# Patient Record
Sex: FEMALE | Race: BLACK OR AFRICAN AMERICAN | URBAN - METROPOLITAN AREA
[De-identification: names, ages, dates, MRNs, and addresses within clinical notes are randomized per-mention and may not be internally consistent; named-entity substitution may affect disease eponyms.]

---

## 2020-06-15 ENCOUNTER — OFFICE VISIT (OUTPATIENT)
Dept: URBAN - METROPOLITAN AREA SURGERY CENTER 16 | Facility: SURGERY CENTER | Age: 55
End: 2020-06-15

## 2020-06-17 ENCOUNTER — OFFICE VISIT (OUTPATIENT)
Dept: URBAN - METROPOLITAN AREA SURGERY CENTER 16 | Facility: SURGERY CENTER | Age: 55
End: 2020-06-17
Payer: COMMERCIAL

## 2020-06-17 DIAGNOSIS — K44.9 DIAPHRAGMATIC HERNIA: ICD-10-CM

## 2020-06-17 DIAGNOSIS — K31.7 BENIGN GASTRIC POLYP: ICD-10-CM

## 2020-06-17 DIAGNOSIS — K21.0 BILE REFLUX ESOPHAGITIS: ICD-10-CM

## 2020-06-17 PROCEDURE — G8907 PT DOC NO EVENTS ON DISCHARG: HCPCS | Performed by: INTERNAL MEDICINE

## 2020-06-17 PROCEDURE — 43239 EGD BIOPSY SINGLE/MULTIPLE: CPT | Performed by: INTERNAL MEDICINE

## 2020-06-17 RX ORDER — LINACLOTIDE 72 UG/1
CAPSULE, GELATIN COATED ORAL
Qty: 0 | Refills: 0 | Status: ACTIVE | COMMUNITY
Start: 1900-01-01 | End: 1900-01-01

## 2020-06-17 RX ORDER — MUPIROCIN 20 MG/G
OINTMENT TOPICAL
Qty: 0 | Refills: 0 | Status: ACTIVE | COMMUNITY
Start: 1900-01-01 | End: 1900-01-01

## 2020-06-17 RX ORDER — PROMETHAZINE HYDROCHLORIDE 25 MG/1
TABLET ORAL
Qty: 0 | Refills: 0 | Status: ACTIVE | COMMUNITY
Start: 1900-01-01 | End: 1900-01-01

## 2020-06-17 RX ORDER — EPINEPHRINE 0.3 MG/.3ML
INJECT 0.3 MILLILITER (0.3 MG) BY INTRAMUSCULAR ROUTE ONCE AS NEEDED FOR ANAPHYLAXIS INJECTION, SOLUTION INTRAMUSCULAR
Qty: 0 | Refills: 0 | Status: ACTIVE | COMMUNITY
Start: 1900-01-01 | End: 1900-01-01

## 2020-09-08 ENCOUNTER — LAB OUTSIDE AN ENCOUNTER (OUTPATIENT)
Dept: URBAN - METROPOLITAN AREA CLINIC 17 | Facility: CLINIC | Age: 55
End: 2020-09-08

## 2020-09-08 ENCOUNTER — OFFICE VISIT (OUTPATIENT)
Dept: URBAN - METROPOLITAN AREA CLINIC 17 | Facility: CLINIC | Age: 55
End: 2020-09-08
Payer: COMMERCIAL

## 2020-09-08 DIAGNOSIS — K21.0 GASTROESOPHAGEAL REFLUX DISEASE WITH ESOPHAGITIS: ICD-10-CM

## 2020-09-08 DIAGNOSIS — R56.9 SEIZURE: ICD-10-CM

## 2020-09-08 DIAGNOSIS — K58.1 IRRITABLE BOWEL SYNDROME WITH CONSTIPATION: ICD-10-CM

## 2020-09-08 DIAGNOSIS — K76.0 HEPATIC STEATOSIS: ICD-10-CM

## 2020-09-08 DIAGNOSIS — D18.03 HEPATIC HEMANGIOMA: ICD-10-CM

## 2020-09-08 DIAGNOSIS — Z12.11 SCREEN FOR COLON CANCER: ICD-10-CM

## 2020-09-08 DIAGNOSIS — K59.09 CHRONIC CONSTIPATION: ICD-10-CM

## 2020-09-08 DIAGNOSIS — R10.13 DYSPEPSIA: ICD-10-CM

## 2020-09-08 DIAGNOSIS — R10.9 RIGHT SIDED ABDOMINAL PAIN: ICD-10-CM

## 2020-09-08 DIAGNOSIS — E66.9 OBESITY (BMI 30-39.9): ICD-10-CM

## 2020-09-08 DIAGNOSIS — R11.12 PROJECTILE VOMITING WITH NAUSEA: ICD-10-CM

## 2020-09-08 PROCEDURE — 99214 OFFICE O/P EST MOD 30 MIN: CPT | Performed by: INTERNAL MEDICINE

## 2020-09-08 PROCEDURE — G8427 DOCREV CUR MEDS BY ELIG CLIN: HCPCS | Performed by: INTERNAL MEDICINE

## 2020-09-08 PROCEDURE — 3017F COLORECTAL CA SCREEN DOC REV: CPT | Performed by: INTERNAL MEDICINE

## 2020-09-08 PROCEDURE — 1036F TOBACCO NON-USER: CPT | Performed by: INTERNAL MEDICINE

## 2020-09-08 PROCEDURE — G8417 CALC BMI ABV UP PARAM F/U: HCPCS | Performed by: INTERNAL MEDICINE

## 2020-09-08 RX ORDER — EPINEPHRINE 0.3 MG/.3ML
INJECT 0.3 MILLILITER (0.3 MG) BY INTRAMUSCULAR ROUTE ONCE AS NEEDED FOR ANAPHYLAXIS INJECTION, SOLUTION INTRAMUSCULAR
Qty: 0 | Refills: 0 | Status: ACTIVE | COMMUNITY
Start: 1900-01-01

## 2020-09-08 RX ORDER — PLECANATIDE 3 MG/1
1 TABLET TABLET ORAL ONCE A DAY
Status: ACTIVE | COMMUNITY

## 2020-09-08 RX ORDER — PROMETHAZINE HYDROCHLORIDE 25 MG/1
TABLET ORAL
Qty: 0 | Refills: 0 | Status: ACTIVE | COMMUNITY
Start: 1900-01-01

## 2020-09-08 RX ORDER — LINACLOTIDE 72 UG/1
CAPSULE, GELATIN COATED ORAL
Qty: 0 | Refills: 0 | Status: DISCONTINUED | COMMUNITY
Start: 1900-01-01

## 2020-09-08 RX ORDER — MUPIROCIN 20 MG/G
OINTMENT TOPICAL
Qty: 0 | Refills: 0 | Status: ACTIVE | COMMUNITY
Start: 1900-01-01

## 2020-09-08 RX ORDER — OXCARBAZEPINE 150 MG/1
2 TABLETS TABLET, FILM COATED ORAL TWICE A DAY
Status: ACTIVE | COMMUNITY

## 2020-09-08 NOTE — HPI-OTHER HISTORIES
55 yo lady pt of dR Hayde Marinelli. She is a pt of DR Morales and wants a second opinion for IBS. She was diagnosed IBS M she says 15 yrs ago. Says in the last year has been mostly constipation. She has about 2 BMs a week spontaneously, hard stools with straining with incomplete evacuation. Per Dr Morales notes, she took 290 mcg 3 times a week due to diarrhea. 145 mcg took 3-4 days to work. She had a normal colonosocpy per his notes in 2015 and was told to repeat in 10 yrs. "I had some polyps a long long long time ago". SHe had R ,mid abdominal pain. Pain is present when she has to have a BM "I am bent over" and then it lets up a bit she says. Per Dr Morales note, a CT abd pelvis in 7/2018 showing stool in RUQ. SHe sometimes wakes up in the am with nausea and vomiting . Mostly it is retching as she has an empty stomach. She says throwing up "is better than what it was, its just the pain". She takes pantoprazole for acid reflux which is controlled. She takes dicyclomine and carafate and peptobismol as needed. She has not needed carafate in 2 weeks. She takes PPI daily and dicyclomine "all the time". Results of CBC and CMP from 7/2019 were unremarkable.  1/14/20 discussed CT from 7/2019 and US from 12/2019. discussed results in detail. I do not have EGD and colon reports from DR Morales yet. On trulance she did not have any abdominal pain. Likely 2/2 IBS C She ran out of samples and has not been able to afford it at this time.  3/12/20 Here for f/u visit. She had a viral infection recently with vomiting. Went to ER - given Zofran, toradol, morphine. Abdominal pain was so severe in RLQ. She had a CT - unremarkable. Pain is still present "but it dont hurt that bad". C/o  burning in her throat "all the time after the episode". No dysphagia. 3/5/20 CT mild thickening of small bowel loops diffusely with few air fluid levels ? mild enteritis. CBC and CMP unremarkable.  5/21/20 doing "a little better". She has been taking linzess 145 "it had stopped working and then I had to take dulcolax for it to work". My notes state that she had been controlled on Trulance. No notes to indicate that she was switched to Linzess. She will go home, check her meds and let us know what she is on. Dicyclomine helps her pain - but makes her go to sleep so she only takes it at night. She has never taken Amitiza.  9/8/20 Says she has brain cavernous malformations and "they may need to do surgery". Says she takes Trulance 3 mg 2-3 times a week. "it cleans out good". She is here with her Aunt "the brain thing makes stutter sometimes". Pantorpazole controls her reflux

## 2020-09-24 ENCOUNTER — OFFICE VISIT (OUTPATIENT)
Dept: URBAN - METROPOLITAN AREA CLINIC 16 | Facility: CLINIC | Age: 55
End: 2020-09-24
Payer: COMMERCIAL

## 2020-09-24 DIAGNOSIS — K76.9 LIVER LESION, RIGHT LOBE: ICD-10-CM

## 2020-09-24 DIAGNOSIS — K76.0 FATTY INFILTRATION OF LIVER: ICD-10-CM

## 2020-09-24 PROCEDURE — 76705 ECHO EXAM OF ABDOMEN: CPT | Performed by: INTERNAL MEDICINE

## 2020-09-24 RX ORDER — PLECANATIDE 3 MG/1
1 TABLET TABLET ORAL ONCE A DAY
Status: ACTIVE | COMMUNITY

## 2020-09-24 RX ORDER — PROMETHAZINE HYDROCHLORIDE 25 MG/1
TABLET ORAL
Qty: 0 | Refills: 0 | Status: ACTIVE | COMMUNITY
Start: 1900-01-01

## 2020-09-24 RX ORDER — OXCARBAZEPINE 150 MG/1
2 TABLETS TABLET, FILM COATED ORAL TWICE A DAY
Status: ACTIVE | COMMUNITY

## 2020-09-24 RX ORDER — MUPIROCIN 20 MG/G
OINTMENT TOPICAL
Qty: 0 | Refills: 0 | Status: ACTIVE | COMMUNITY
Start: 1900-01-01

## 2020-09-24 RX ORDER — EPINEPHRINE 0.3 MG/.3ML
INJECT 0.3 MILLILITER (0.3 MG) BY INTRAMUSCULAR ROUTE ONCE AS NEEDED FOR ANAPHYLAXIS INJECTION, SOLUTION INTRAMUSCULAR
Qty: 0 | Refills: 0 | Status: ACTIVE | COMMUNITY
Start: 1900-01-01

## 2020-12-10 ENCOUNTER — OFFICE VISIT (OUTPATIENT)
Dept: URBAN - METROPOLITAN AREA CLINIC 17 | Facility: CLINIC | Age: 55
End: 2020-12-10
Payer: COMMERCIAL

## 2020-12-10 DIAGNOSIS — K58.1 IRRITABLE BOWEL SYNDROME WITH CONSTIPATION: ICD-10-CM

## 2020-12-10 DIAGNOSIS — R11.12 PROJECTILE VOMITING WITH NAUSEA: ICD-10-CM

## 2020-12-10 DIAGNOSIS — R56.9 SEIZURE: ICD-10-CM

## 2020-12-10 DIAGNOSIS — R10.13 DYSPEPSIA: ICD-10-CM

## 2020-12-10 DIAGNOSIS — K59.09 CHRONIC CONSTIPATION: ICD-10-CM

## 2020-12-10 DIAGNOSIS — Z12.11 SCREEN FOR COLON CANCER: ICD-10-CM

## 2020-12-10 DIAGNOSIS — R10.84 ABDOMINAL CRAMPING, GENERALIZED: ICD-10-CM

## 2020-12-10 DIAGNOSIS — R10.9 RIGHT SIDED ABDOMINAL PAIN: ICD-10-CM

## 2020-12-10 DIAGNOSIS — E66.9 OBESITY (BMI 30-39.9): ICD-10-CM

## 2020-12-10 PROCEDURE — G8417 CALC BMI ABV UP PARAM F/U: HCPCS | Performed by: INTERNAL MEDICINE

## 2020-12-10 PROCEDURE — G8427 DOCREV CUR MEDS BY ELIG CLIN: HCPCS | Performed by: INTERNAL MEDICINE

## 2020-12-10 PROCEDURE — G9903 PT SCRN TBCO ID AS NON USER: HCPCS | Performed by: INTERNAL MEDICINE

## 2020-12-10 PROCEDURE — G8482 FLU IMMUNIZE ORDER/ADMIN: HCPCS | Performed by: INTERNAL MEDICINE

## 2020-12-10 PROCEDURE — 99214 OFFICE O/P EST MOD 30 MIN: CPT | Performed by: INTERNAL MEDICINE

## 2020-12-10 PROCEDURE — 1036F TOBACCO NON-USER: CPT | Performed by: INTERNAL MEDICINE

## 2020-12-10 PROCEDURE — 3017F COLORECTAL CA SCREEN DOC REV: CPT | Performed by: INTERNAL MEDICINE

## 2020-12-10 RX ORDER — EPINEPHRINE 0.3 MG/.3ML
INJECT 0.3 MILLILITER (0.3 MG) BY INTRAMUSCULAR ROUTE ONCE AS NEEDED FOR ANAPHYLAXIS INJECTION, SOLUTION INTRAMUSCULAR
Qty: 0 | Refills: 0 | Status: ACTIVE | COMMUNITY
Start: 1900-01-01

## 2020-12-10 RX ORDER — TOPIRAMATE 25 MG/1
1 TABLET TABLET, FILM COATED ORAL ONCE A DAY
Status: ACTIVE | COMMUNITY

## 2020-12-10 RX ORDER — PREDNISONE 20 MG/1
1 TABLET TABLET ORAL ONCE A DAY
Status: ACTIVE | COMMUNITY

## 2020-12-10 RX ORDER — OXCARBAZEPINE 150 MG/1
2 TABLETS TABLET, FILM COATED ORAL TWICE A DAY
Status: ACTIVE | COMMUNITY

## 2020-12-10 RX ORDER — MUPIROCIN 20 MG/G
OINTMENT TOPICAL
Qty: 0 | Refills: 0 | Status: ACTIVE | COMMUNITY
Start: 1900-01-01

## 2020-12-10 RX ORDER — PLECANATIDE 3 MG/1
1 TABLET TABLET ORAL ONCE A DAY
Status: ACTIVE | COMMUNITY

## 2020-12-10 RX ORDER — PROMETHAZINE HYDROCHLORIDE 25 MG/1
TABLET ORAL
Qty: 0 | Refills: 0 | Status: ACTIVE | COMMUNITY
Start: 1900-01-01

## 2020-12-10 NOTE — HPI-OTHER HISTORIES
55 yo lady pt of dR Hayde Marinelli. She is a pt of DR Morales and wants a second opinion for IBS. She was diagnosed IBS M she says 15 yrs ago. Says in the last year has been mostly constipation. She has about 2 BMs a week spontaneously, hard stools with straining with incomplete evacuation. Per Dr Morales notes, she took 290 mcg 3 times a week due to diarrhea. 145 mcg took 3-4 days to work. She had a normal colonosocpy per his notes in 2015 and was told to repeat in 10 yrs. "I had some polyps a long long long time ago". SHe had R ,mid abdominal pain. Pain is present when she has to have a BM "I am bent over" and then it lets up a bit she says. Per Dr Morales note, a CT abd pelvis in 7/2018 showing stool in RUQ. SHe sometimes wakes up in the am with nausea and vomiting . Mostly it is retching as she has an empty stomach. She says throwing up "is better than what it was, its just the pain". She takes pantoprazole for acid reflux which is controlled. She takes dicyclomine and carafate and peptobismol as needed. She has not needed carafate in 2 weeks. She takes PPI daily and dicyclomine "all the time". Results of CBC and CMP from 7/2019 were unremarkable.  1/14/20 discussed CT from 7/2019 and US from 12/2019. discussed results in detail. I do not have EGD and colon reports from DR Morales yet. On trulance she did not have any abdominal pain. Likely 2/2 IBS C She ran out of samples and has not been able to afford it at this time.  3/12/20 Here for f/u visit. She had a viral infection recently with vomiting. Went to ER - given Zofran, toradol, morphine. Abdominal pain was so severe in RLQ. She had a CT - unremarkable. Pain is still present "but it dont hurt that bad". C/o  burning in her throat "all the time after the episode". No dysphagia. 3/5/20 CT mild thickening of small bowel loops diffusely with few air fluid levels ? mild enteritis. CBC and CMP unremarkable.  5/21/20 doing "a little better". She has been taking linzess 145 "it had stopped working and then I had to take dulcolax for it to work". My notes state that she had been controlled on Trulance. No notes to indicate that she was switched to Linzess. She will go home, check her meds and let us know what she is on. Dicyclomine helps her pain - but makes her go to sleep so she only takes it at night. She has never taken Amitiza.  9/8/20 Says she has brain cavernous malformations and "they may need to do surgery". Says she takes Trulance 3 mg 2-3 times a week. "it cleans out good". She is here with her Aunt "the brain thing makes stutter sometimes". Pantorpazole controls her reflux  12/10/20 Was recently diagnosed with epilepsy. She cant drive for 6 months. Her  has been very supportive.  Is now  on oxacarbazepine, topiramate, and prednisone. She takes Trulance 3 mg 2-3 times a week, she has a BM qod.  She c/o R flank pain "sometimes I cant walk good, it is so bad it makes me cry".  She was in the ED 12/7 for same.  CT stone protocol stable 2.8 cm liver lesion. ; stable L1 hemangioma in vertebral body.  It was thought msk and she was given a lidoderm patch with improvement.

## 2020-12-10 NOTE — EXAM-PHYSICAL EXAM
Gen: Alert, oriented, in no distress Heent: no icterus, lips wnl Lungs: bilateral breath sounds CVS: first and second heart sounds Abdomen: full, soft, non tender. tenderness is over lower R flank rib reproducible Ext: no edema Joints: normal joints and symmetry Spine: consistent with age Skin: no rash on exposed areas Neuro: no focal localizing signs

## 2020-12-23 ENCOUNTER — TELEPHONE ENCOUNTER (OUTPATIENT)
Dept: URBAN - METROPOLITAN AREA CLINIC 105 | Facility: CLINIC | Age: 55
End: 2020-12-23

## 2020-12-23 RX ORDER — PANTOPRAZOLE SODIUM 40 MG/1
1 TABLET TABLET, DELAYED RELEASE ORAL ONCE A DAY
Qty: 90 | Refills: 0 | OUTPATIENT
Start: 2020-12-24

## 2020-12-24 ENCOUNTER — TELEPHONE ENCOUNTER (OUTPATIENT)
Dept: URBAN - METROPOLITAN AREA CLINIC 92 | Facility: CLINIC | Age: 55
End: 2020-12-24

## 2021-11-23 ENCOUNTER — OFFICE VISIT (OUTPATIENT)
Dept: URBAN - METROPOLITAN AREA CLINIC 17 | Facility: CLINIC | Age: 56
End: 2021-11-23
Payer: COMMERCIAL

## 2021-11-23 ENCOUNTER — WEB ENCOUNTER (OUTPATIENT)
Dept: URBAN - METROPOLITAN AREA CLINIC 17 | Facility: CLINIC | Age: 56
End: 2021-11-23

## 2021-11-23 VITALS
HEIGHT: 64 IN | TEMPERATURE: 97.5 F | BODY MASS INDEX: 37.56 KG/M2 | WEIGHT: 220 LBS | DIASTOLIC BLOOD PRESSURE: 70 MMHG | SYSTOLIC BLOOD PRESSURE: 110 MMHG | HEART RATE: 80 BPM

## 2021-11-23 DIAGNOSIS — R10.13 DYSPEPSIA: ICD-10-CM

## 2021-11-23 DIAGNOSIS — E66.9 OBESITY (BMI 30-39.9): ICD-10-CM

## 2021-11-23 DIAGNOSIS — K58.1 IRRITABLE BOWEL SYNDROME WITH CONSTIPATION: ICD-10-CM

## 2021-11-23 DIAGNOSIS — Z12.11 SCREEN FOR COLON CANCER: ICD-10-CM

## 2021-11-23 DIAGNOSIS — G40.919 INTRACTABLE EPILEPSY WITHOUT STATUS EPILEPTICUS, UNSPECIFIED EPILEPSY TYPE: ICD-10-CM

## 2021-11-23 DIAGNOSIS — R56.9 SEIZURE: ICD-10-CM

## 2021-11-23 DIAGNOSIS — K76.0 HEPATIC STEATOSIS: ICD-10-CM

## 2021-11-23 DIAGNOSIS — D18.03 HEPATIC HEMANGIOMA: ICD-10-CM

## 2021-11-23 DIAGNOSIS — R11.12 PROJECTILE VOMITING WITH NAUSEA: ICD-10-CM

## 2021-11-23 DIAGNOSIS — K21.00 CHRONIC REFLUX ESOPHAGITIS: ICD-10-CM

## 2021-11-23 DIAGNOSIS — R10.84 ABDOMINAL PAIN, GENERALIZED: ICD-10-CM

## 2021-11-23 PROCEDURE — 99213 OFFICE O/P EST LOW 20 MIN: CPT | Performed by: INTERNAL MEDICINE

## 2021-11-23 RX ORDER — PANTOPRAZOLE SODIUM 40 MG/1
1 TABLET TABLET, DELAYED RELEASE ORAL ONCE A DAY
Qty: 90 | Refills: 3
Start: 2020-12-24

## 2021-11-23 RX ORDER — OXCARBAZEPINE 150 MG/1
2 TABLETS TABLET, FILM COATED ORAL TWICE A DAY
Status: ACTIVE | COMMUNITY

## 2021-11-23 RX ORDER — PLECANATIDE 3 MG/1
1 TABLET TABLET ORAL ONCE A DAY
Qty: 90 | Refills: 3

## 2021-11-23 RX ORDER — MUPIROCIN 20 MG/G
OINTMENT TOPICAL
Qty: 0 | Refills: 0 | Status: ACTIVE | COMMUNITY
Start: 1900-01-01

## 2021-11-23 RX ORDER — PROMETHAZINE HYDROCHLORIDE 25 MG/1
TABLET ORAL
Qty: 0 | Refills: 0 | Status: ACTIVE | COMMUNITY
Start: 1900-01-01

## 2021-11-23 RX ORDER — PLECANATIDE 3 MG/1
1 TABLET TABLET ORAL ONCE A DAY
Status: ACTIVE | COMMUNITY

## 2021-11-23 RX ORDER — PREDNISONE 20 MG/1
1 TABLET TABLET ORAL ONCE A DAY
Status: ACTIVE | COMMUNITY

## 2021-11-23 RX ORDER — PANTOPRAZOLE SODIUM 40 MG/1
1 TABLET TABLET, DELAYED RELEASE ORAL ONCE A DAY
Qty: 90 | Refills: 0 | Status: ACTIVE | COMMUNITY
Start: 2020-12-24

## 2021-11-23 RX ORDER — TOPIRAMATE 25 MG/1
1 TABLET TABLET, FILM COATED ORAL ONCE A DAY
Status: ACTIVE | COMMUNITY

## 2021-11-23 RX ORDER — EPINEPHRINE 0.3 MG/.3ML
INJECT 0.3 MILLILITER (0.3 MG) BY INTRAMUSCULAR ROUTE ONCE AS NEEDED FOR ANAPHYLAXIS INJECTION, SOLUTION INTRAMUSCULAR
Qty: 0 | Refills: 0 | Status: ACTIVE | COMMUNITY
Start: 1900-01-01

## 2021-11-23 NOTE — HPI-OTHER HISTORIES
53 yo lady pt of dR Hayde Marinelli. She is a pt of DR Morales and wants a second opinion for IBS. She was diagnosed IBS M she says 15 yrs ago. Says in the last year has been mostly constipation. She has about 2 BMs a week spontaneously, hard stools with straining with incomplete evacuation. Per Dr Morales notes, she took 290 mcg 3 times a week due to diarrhea. 145 mcg took 3-4 days to work. She had a normal colonosocpy per his notes in 2015 and was told to repeat in 10 yrs. "I had some polyps a long long long time ago". SHe had R ,mid abdominal pain. Pain is present when she has to have a BM "I am bent over" and then it lets up a bit she says. Per Dr Morales note, a CT abd pelvis in 7/2018 showing stool in RUQ. SHe sometimes wakes up in the am with nausea and vomiting . Mostly it is retching as she has an empty stomach. She says throwing up "is better than what it was, its just the pain". She takes pantoprazole for acid reflux which is controlled. She takes dicyclomine and carafate and peptobismol as needed. She has not needed carafate in 2 weeks. She takes PPI daily and dicyclomine "all the time". Results of CBC and CMP from 7/2019 were unremarkable.  1/14/20 discussed CT from 7/2019 and US from 12/2019. discussed results in detail. I do not have EGD and colon reports from DR Morales yet. On trulance she did not have any abdominal pain. Likely 2/2 IBS C She ran out of samples and has not been able to afford it at this time.  3/12/20 Here for f/u visit. She had a viral infection recently with vomiting. Went to ER - given Zofran, toradol, morphine. Abdominal pain was so severe in RLQ. She had a CT - unremarkable. Pain is still present "but it dont hurt that bad". C/o  burning in her throat "all the time after the episode". No dysphagia. 3/5/20 CT mild thickening of small bowel loops diffusely with few air fluid levels ? mild enteritis. CBC and CMP unremarkable.  5/21/20 doing "a little better". She has been taking linzess 145 "it had stopped working and then I had to take dulcolax for it to work". My notes state that she had been controlled on Trulance. No notes to indicate that she was switched to Linzess. She will go home, check her meds and let us know what she is on. Dicyclomine helps her pain - but makes her go to sleep so she only takes it at night. She has never taken Amitiza.  9/8/20 Says she has brain cavernous malformations and "they may need to do surgery". Says she takes Trulance 3 mg 2-3 times a week. "it cleans out good". She is here with her Aunt "the brain thing makes stutter sometimes". Pantorpazole controls her reflux  12/10/20 Was recently diagnosed with epilepsy. She cant drive for 6 months. Her  has been very supportive.  Is now  on oxacarbazepine, topiramate, and prednisone. She takes Trulance 3 mg 2-3 times a week, she has a BM qod.  She c/o R flank pain "sometimes I cant walk good, it is so bad it makes me cry".  She was in the ED 12/7 for same.  CT stone protocol stable 2.8 cm liver lesion. ; stable L1 hemangioma in vertebral body.  It was thought msk and she was given a lidoderm patch with improvement.   11/23/21 Here for f.u visit Ran out of Pantoprazole 40 mg which usually controls her reflux Need Trulance also and needs samples/script. Says she wanted also to update me on her health. Still not driving as she kept having seizures. She says brain surgery is being discussed.

## 2021-11-24 LAB
ALBUMIN: 4.5
ALKALINE PHOSPHATASE: 114
ALT (SGPT): 25
AST (SGOT): 20
BILIRUBIN, DIRECT: <0.1
BILIRUBIN, TOTAL: <0.2
PROTEIN, TOTAL: 6.9

## 2021-11-30 ENCOUNTER — OFFICE VISIT (OUTPATIENT)
Dept: URBAN - METROPOLITAN AREA CLINIC 17 | Facility: CLINIC | Age: 56
End: 2021-11-30

## 2022-05-19 ENCOUNTER — CLAIMS CREATED FROM THE CLAIM WINDOW (OUTPATIENT)
Dept: URBAN - METROPOLITAN AREA CLINIC 17 | Facility: CLINIC | Age: 57
End: 2022-05-19
Payer: COMMERCIAL

## 2022-05-19 DIAGNOSIS — D18.03 HEPATIC HEMANGIOMA: ICD-10-CM

## 2022-05-19 DIAGNOSIS — R56.9 SEIZURE: ICD-10-CM

## 2022-05-19 DIAGNOSIS — R10.13 DYSPEPSIA: ICD-10-CM

## 2022-05-19 DIAGNOSIS — K21.0 GASTROESOPHAGEAL REFLUX DISEASE WITH ESOPHAGITIS: ICD-10-CM

## 2022-05-19 DIAGNOSIS — K21.00 ALKALINE REFLUX ESOPHAGITIS: ICD-10-CM

## 2022-05-19 DIAGNOSIS — Z12.11 SCREEN FOR COLON CANCER: ICD-10-CM

## 2022-05-19 DIAGNOSIS — K59.09 CHRONIC CONSTIPATION: ICD-10-CM

## 2022-05-19 DIAGNOSIS — G40.919 INTRACTABLE EPILEPSY WITHOUT STATUS EPILEPTICUS, UNSPECIFIED EPILEPSY TYPE: ICD-10-CM

## 2022-05-19 DIAGNOSIS — R10.9 FLANK PAIN: ICD-10-CM

## 2022-05-19 DIAGNOSIS — K58.1 IRRITABLE BOWEL SYNDROME WITH CONSTIPATION: ICD-10-CM

## 2022-05-19 DIAGNOSIS — R10.9 RIGHT SIDED ABDOMINAL PAIN: ICD-10-CM

## 2022-05-19 DIAGNOSIS — K76.0 HEPATIC STEATOSIS: ICD-10-CM

## 2022-05-19 DIAGNOSIS — R10.84 ABDOMINAL PAIN, GENERALIZED: ICD-10-CM

## 2022-05-19 DIAGNOSIS — E66.9 OBESITY (BMI 30-39.9): ICD-10-CM

## 2022-05-19 DIAGNOSIS — R11.12 PROJECTILE VOMITING WITH NAUSEA: ICD-10-CM

## 2022-05-19 PROCEDURE — 99213 OFFICE O/P EST LOW 20 MIN: CPT | Performed by: INTERNAL MEDICINE

## 2022-05-19 RX ORDER — OXCARBAZEPINE 150 MG/1
2 TABLETS TABLET, FILM COATED ORAL TWICE A DAY
Status: ACTIVE | COMMUNITY

## 2022-05-19 RX ORDER — EPINEPHRINE 0.3 MG/.3ML
INJECT 0.3 MILLILITER (0.3 MG) BY INTRAMUSCULAR ROUTE ONCE AS NEEDED FOR ANAPHYLAXIS INJECTION, SOLUTION INTRAMUSCULAR
Qty: 0 | Refills: 0 | Status: ON HOLD | COMMUNITY
Start: 1900-01-01

## 2022-05-19 RX ORDER — PROMETHAZINE HYDROCHLORIDE 25 MG/1
TABLET ORAL
Qty: 0 | Refills: 0 | Status: ON HOLD | COMMUNITY
Start: 1900-01-01

## 2022-05-19 RX ORDER — MUPIROCIN 20 MG/G
OINTMENT TOPICAL
Qty: 0 | Refills: 0 | Status: ON HOLD | COMMUNITY
Start: 1900-01-01

## 2022-05-19 RX ORDER — PANTOPRAZOLE SODIUM 40 MG/1
1 TABLET TABLET, DELAYED RELEASE ORAL ONCE A DAY
Qty: 90 | Refills: 3 | Status: ACTIVE | COMMUNITY
Start: 2020-12-24

## 2022-05-19 RX ORDER — TOPIRAMATE 25 MG/1
1 TABLET TABLET, FILM COATED ORAL ONCE A DAY
Status: ACTIVE | COMMUNITY

## 2022-05-19 RX ORDER — PANTOPRAZOLE SODIUM 40 MG/1
1 TABLET TABLET, DELAYED RELEASE ORAL ONCE A DAY
Qty: 90 | Refills: 3

## 2022-05-19 RX ORDER — PLECANATIDE 3 MG/1
1 TABLET TABLET ORAL ONCE A DAY
Qty: 90 | Refills: 3

## 2022-05-19 RX ORDER — PLECANATIDE 3 MG/1
1 TABLET TABLET ORAL ONCE A DAY
Qty: 90 | Refills: 3 | Status: ACTIVE | COMMUNITY

## 2022-05-19 RX ORDER — PREDNISONE 20 MG/1
1 TABLET TABLET ORAL ONCE A DAY
Status: ON HOLD | COMMUNITY

## 2022-05-19 NOTE — HPI-OTHER HISTORIES
55 yo lady pt of dR Hayde Marinelli. She is a pt of DR Morales and wants a second opinion for IBS. She was diagnosed IBS M she says 15 yrs ago. Says in the last year has been mostly constipation. She has about 2 BMs a week spontaneously, hard stools with straining with incomplete evacuation. Per Dr Morales notes, she took 290 mcg 3 times a week due to diarrhea. 145 mcg took 3-4 days to work. She had a normal colonosocpy per his notes in 2015 and was told to repeat in 10 yrs. "I had some polyps a long long long time ago". SHe had R ,mid abdominal pain. Pain is present when she has to have a BM "I am bent over" and then it lets up a bit she says. Per Dr Morales note, a CT abd pelvis in 7/2018 showing stool in RUQ. SHe sometimes wakes up in the am with nausea and vomiting . Mostly it is retching as she has an empty stomach. She says throwing up "is better than what it was, its just the pain". She takes pantoprazole for acid reflux which is controlled. She takes dicyclomine and carafate and peptobismol as needed. She has not needed carafate in 2 weeks. She takes PPI daily and dicyclomine "all the time". Results of CBC and CMP from 7/2019 were unremarkable.  1/14/20 discussed CT from 7/2019 and US from 12/2019. discussed results in detail. I do not have EGD and colon reports from DR Morales yet. On trulance she did not have any abdominal pain. Likely 2/2 IBS C She ran out of samples and has not been able to afford it at this time.  3/12/20 Here for f/u visit. She had a viral infection recently with vomiting. Went to ER - given Zofran, toradol, morphine. Abdominal pain was so severe in RLQ. She had a CT - unremarkable. Pain is still present "but it dont hurt that bad". C/o  burning in her throat "all the time after the episode". No dysphagia. 3/5/20 CT mild thickening of small bowel loops diffusely with few air fluid levels ? mild enteritis. CBC and CMP unremarkable.  5/21/20 doing "a little better". She has been taking linzess 145 "it had stopped working and then I had to take dulcolax for it to work". My notes state that she had been controlled on Trulance. No notes to indicate that she was switched to Linzess. She will go home, check her meds and let us know what she is on. Dicyclomine helps her pain - but makes her go to sleep so she only takes it at night. She has never taken Amitiza.  9/8/20 Says she has brain cavernous malformations and "they may need to do surgery". Says she takes Trulance 3 mg 2-3 times a week. "it cleans out good". She is here with her Aunt "the brain thing makes stutter sometimes". Pantorpazole controls her reflux  12/10/20 Was recently diagnosed with epilepsy. She cant drive for 6 months. Her  has been very supportive.  Is now  on oxacarbazepine, topiramate, and prednisone. She takes Trulance 3 mg 2-3 times a week, she has a BM qod.  She c/o R flank pain "sometimes I cant walk good, it is so bad it makes me cry".  She was in the ED 12/7 for same.  CT stone protocol stable 2.8 cm liver lesion. ; stable L1 hemangioma in vertebral body.  It was thought msk and she was given a lidoderm patch with improvement.   11/23/21 Here for f.u visit Ran out of Pantoprazole 40 mg which usually controls her reflux Need Trulance also and needs samples/script. Says she wanted also to update me on her health. Still not driving as she kept having seizures. She says brain surgery is being discussed.   5/19/22 Here for f.u visit SAys had brain surgery 1/2022 and has had seizures afterwards. Will  need more surgery WAs in ER about 3 months ago for R sided abd pain. now resolved. [facility] to [facility]. Says CT was negative.  Ran out of Trulance - needs a script.  Takes Pantoprazole daily which controls her reflux/ Says her recent weight gain makes it imperative that she takes her PPI daily.  Rarely needs to take dicyclomine now

## 2022-05-24 ENCOUNTER — OFFICE VISIT (OUTPATIENT)
Dept: URBAN - METROPOLITAN AREA CLINIC 17 | Facility: CLINIC | Age: 57
End: 2022-05-24

## 2022-08-08 ENCOUNTER — OFFICE VISIT (OUTPATIENT)
Dept: URBAN - METROPOLITAN AREA CLINIC 17 | Facility: CLINIC | Age: 57
End: 2022-08-08
Payer: COMMERCIAL

## 2022-08-08 VITALS
HEIGHT: 64 IN | DIASTOLIC BLOOD PRESSURE: 76 MMHG | SYSTOLIC BLOOD PRESSURE: 123 MMHG | TEMPERATURE: 97.9 F | HEART RATE: 68 BPM | WEIGHT: 222 LBS | BODY MASS INDEX: 37.9 KG/M2

## 2022-08-08 DIAGNOSIS — K76.0 HEPATIC STEATOSIS: ICD-10-CM

## 2022-08-08 DIAGNOSIS — E66.9 OBESITY (BMI 30-39.9): ICD-10-CM

## 2022-08-08 DIAGNOSIS — R11.12 PROJECTILE VOMITING WITH NAUSEA: ICD-10-CM

## 2022-08-08 DIAGNOSIS — R10.84 ABDOMINAL CRAMPING, GENERALIZED: ICD-10-CM

## 2022-08-08 DIAGNOSIS — D18.03 HEPATIC HEMANGIOMA: ICD-10-CM

## 2022-08-08 DIAGNOSIS — Z12.11 SCREEN FOR COLON CANCER: ICD-10-CM

## 2022-08-08 DIAGNOSIS — G40.919 INTRACTABLE EPILEPSY WITHOUT STATUS EPILEPTICUS, UNSPECIFIED EPILEPSY TYPE: ICD-10-CM

## 2022-08-08 DIAGNOSIS — K58.1 IRRITABLE BOWEL SYNDROME WITH CONSTIPATION: ICD-10-CM

## 2022-08-08 DIAGNOSIS — R10.13 DYSPEPSIA: ICD-10-CM

## 2022-08-08 PROCEDURE — 99214 OFFICE O/P EST MOD 30 MIN: CPT | Performed by: INTERNAL MEDICINE

## 2022-08-08 RX ORDER — DICYCLOMINE HYDROCHLORIDE 10 MG/1
1 TABLET CAPSULE ORAL
Qty: 270 | Refills: 0 | OUTPATIENT
Start: 2022-08-08 | End: 2022-11-06

## 2022-08-08 RX ORDER — PROMETHAZINE HYDROCHLORIDE 25 MG/1
TABLET ORAL
Qty: 0 | Refills: 0 | Status: ACTIVE | COMMUNITY
Start: 1900-01-01

## 2022-08-08 RX ORDER — PANTOPRAZOLE SODIUM 40 MG/1
1 TABLET TABLET, DELAYED RELEASE ORAL ONCE A DAY
Qty: 180 TABLET | Refills: 3

## 2022-08-08 RX ORDER — EPINEPHRINE 0.3 MG/.3ML
INJECT 0.3 MILLILITER (0.3 MG) BY INTRAMUSCULAR ROUTE ONCE AS NEEDED FOR ANAPHYLAXIS INJECTION, SOLUTION INTRAMUSCULAR
Qty: 0 | Refills: 0 | Status: ACTIVE | COMMUNITY
Start: 1900-01-01

## 2022-08-08 RX ORDER — ATORVASTATIN CALCIUM 10 MG/1
1 TABLET TABLET, FILM COATED ORAL ONCE A DAY
Status: ACTIVE | COMMUNITY

## 2022-08-08 RX ORDER — PLECANATIDE 3 MG/1
1 TABLET TABLET ORAL ONCE A DAY
Qty: 90 | Refills: 3 | Status: ACTIVE | COMMUNITY

## 2022-08-08 RX ORDER — PANTOPRAZOLE SODIUM 40 MG/1
1 TABLET TABLET, DELAYED RELEASE ORAL ONCE A DAY
Qty: 90 | Refills: 3 | Status: ACTIVE | COMMUNITY

## 2022-08-08 RX ORDER — TOPIRAMATE 25 MG/1
1 TABLET TABLET, FILM COATED ORAL ONCE A DAY
Status: ACTIVE | COMMUNITY

## 2022-08-08 RX ORDER — OXCARBAZEPINE 150 MG/1
2 TABLETS TABLET, FILM COATED ORAL TWICE A DAY
Status: DISCONTINUED | COMMUNITY

## 2022-08-08 RX ORDER — PREDNISONE 20 MG/1
1 TABLET TABLET ORAL ONCE A DAY
Status: DISCONTINUED | COMMUNITY

## 2022-08-08 RX ORDER — METHOTREXATE 2.5 MG/1
AS DIRECTED TABLET ORAL
Status: ACTIVE | COMMUNITY

## 2022-08-08 RX ORDER — MUPIROCIN 20 MG/G
OINTMENT TOPICAL
Qty: 0 | Refills: 0 | Status: ACTIVE | COMMUNITY
Start: 1900-01-01

## 2022-08-08 RX ORDER — ONDANSETRON HYDROCHLORIDE 8 MG/1
1 CAPSULE TABLET, FILM COATED ORAL
Qty: 90 | Refills: 0 | OUTPATIENT
Start: 2022-08-08 | End: 2022-09-07

## 2022-08-08 NOTE — HPI-OTHER HISTORIES
55 yo lady pt of dR Hayde Marinelli. She is a pt of DR Morales and wants a second opinion for IBS. She was diagnosed IBS M she says 15 yrs ago. Says in the last year has been mostly constipation. She has about 2 BMs a week spontaneously, hard stools with straining with incomplete evacuation. Per Dr Morales notes, she took 290 mcg 3 times a week due to diarrhea. 145 mcg took 3-4 days to work. She had a normal colonosocpy per his notes in 2015 and was told to repeat in 10 yrs. "I had some polyps a long long long time ago". SHe had R ,mid abdominal pain. Pain is present when she has to have a BM "I am bent over" and then it lets up a bit she says. Per Dr Morales note, a CT abd pelvis in 7/2018 showing stool in RUQ. SHe sometimes wakes up in the am with nausea and vomiting . Mostly it is retching as she has an empty stomach. She says throwing up "is better than what it was, its just the pain". She takes pantoprazole for acid reflux which is controlled. She takes dicyclomine and carafate and peptobismol as needed. She has not needed carafate in 2 weeks. She takes PPI daily and dicyclomine "all the time". Results of CBC and CMP from 7/2019 were unremarkable.  1/14/20 discussed CT from 7/2019 and US from 12/2019. discussed results in detail. I do not have EGD and colon reports from DR Morales yet. On trulance she did not have any abdominal pain. Likely 2/2 IBS C She ran out of samples and has not been able to afford it at this time.  3/12/20 Here for f/u visit. She had a viral infection recently with vomiting. Went to ER - given Zofran, toradol, morphine. Abdominal pain was so severe in RLQ. She had a CT - unremarkable. Pain is still present "but it dont hurt that bad". C/o  burning in her throat "all the time after the episode". No dysphagia. 3/5/20 CT mild thickening of small bowel loops diffusely with few air fluid levels ? mild enteritis. CBC and CMP unremarkable.  5/21/20 doing "a little better". She has been taking linzess 145 "it had stopped working and then I had to take dulcolax for it to work". My notes state that she had been controlled on Trulance. No notes to indicate that she was switched to Linzess. She will go home, check her meds and let us know what she is on. Dicyclomine helps her pain - but makes her go to sleep so she only takes it at night. She has never taken Amitiza.  9/8/20 Says she has brain cavernous malformations and "they may need to do surgery". Says she takes Trulance 3 mg 2-3 times a week. "it cleans out good". She is here with her Aunt "the brain thing makes stutter sometimes". Pantorpazole controls her reflux  12/10/20 Was recently diagnosed with epilepsy. She cant drive for 6 months. Her  has been very supportive.  Is now  on oxacarbazepine, topiramate, and prednisone. She takes Trulance 3 mg 2-3 times a week, she has a BM qod.  She c/o R flank pain "sometimes I cant walk good, it is so bad it makes me cry".  She was in the ED 12/7 for same.  CT stone protocol stable 2.8 cm liver lesion. ; stable L1 hemangioma in vertebral body.  It was thought msk and she was given a lidoderm patch with improvement.   11/23/21 Here for f.u visit Ran out of Pantoprazole 40 mg which usually controls her reflux Need Trulance also and needs samples/script. Says she wanted also to update me on her health. Still not driving as she kept having seizures. She says brain surgery is being discussed.   5/19/22 Here for f.u visit SAys had brain surgery 1/2022 and has had seizures afterwards. Will  need more surgery WAs in ER about 3 months ago for R sided abd pain. now resolved. [facility].  Ran out of Trulance - needs a script.  Takes Pantoprazole daily which controls her reflux/ Says her recent weight gain makes it imperative that she takes her PPI daily.  Rarely needs to take dicyclomine now  8/8/22 Here for f.u visit with her .  SAys over the past month her PPI has not been working she also ran out 1 weeks ago. She is coughing all night. Says her throat burns with nausea.  Has been taking  a lot of pecid - has to take 2 or more.  No vomiting.  Taking Trulance at night which is working v well. Has doubled up on her epilepsy meds per her MD instructions so she is sleeping all day and lying down all day. After meds and meals included.

## 2022-09-12 ENCOUNTER — OFFICE VISIT (OUTPATIENT)
Dept: URBAN - METROPOLITAN AREA CLINIC 17 | Facility: CLINIC | Age: 57
End: 2022-09-12
Payer: COMMERCIAL

## 2022-09-12 VITALS
HEART RATE: 89 BPM | WEIGHT: 221 LBS | DIASTOLIC BLOOD PRESSURE: 68 MMHG | SYSTOLIC BLOOD PRESSURE: 106 MMHG | TEMPERATURE: 97.9 F | BODY MASS INDEX: 37.73 KG/M2 | HEIGHT: 64 IN

## 2022-09-12 DIAGNOSIS — R11.12 PROJECTILE VOMITING WITH NAUSEA: ICD-10-CM

## 2022-09-12 DIAGNOSIS — K58.1 IRRITABLE BOWEL SYNDROME WITH CONSTIPATION: ICD-10-CM

## 2022-09-12 DIAGNOSIS — Z12.11 SCREEN FOR COLON CANCER: ICD-10-CM

## 2022-09-12 DIAGNOSIS — K58.9 IRRITABLE BOWEL SYNDROME (IBS): ICD-10-CM

## 2022-09-12 DIAGNOSIS — G40.919 INTRACTABLE EPILEPSY WITHOUT STATUS EPILEPTICUS, UNSPECIFIED EPILEPSY TYPE: ICD-10-CM

## 2022-09-12 DIAGNOSIS — E66.9 OBESITY (BMI 30-39.9): ICD-10-CM

## 2022-09-12 DIAGNOSIS — D18.03 HEPATIC HEMANGIOMA: ICD-10-CM

## 2022-09-12 DIAGNOSIS — R10.9 RIGHT SIDED ABDOMINAL PAIN: ICD-10-CM

## 2022-09-12 DIAGNOSIS — K76.0 HEPATIC STEATOSIS: ICD-10-CM

## 2022-09-12 DIAGNOSIS — R10.13 DYSPEPSIA: ICD-10-CM

## 2022-09-12 DIAGNOSIS — K59.09 CHRONIC CONSTIPATION: ICD-10-CM

## 2022-09-12 PROCEDURE — 99214 OFFICE O/P EST MOD 30 MIN: CPT | Performed by: INTERNAL MEDICINE

## 2022-09-12 RX ORDER — MUPIROCIN 20 MG/G
OINTMENT TOPICAL
Qty: 0 | Refills: 0 | Status: ACTIVE | COMMUNITY
Start: 1900-01-01

## 2022-09-12 RX ORDER — TOPIRAMATE 25 MG/1
1 TABLET TABLET, FILM COATED ORAL ONCE A DAY
Status: ACTIVE | COMMUNITY

## 2022-09-12 RX ORDER — EPINEPHRINE 0.3 MG/.3ML
INJECT 0.3 MILLILITER (0.3 MG) BY INTRAMUSCULAR ROUTE ONCE AS NEEDED FOR ANAPHYLAXIS INJECTION, SOLUTION INTRAMUSCULAR
Qty: 0 | Refills: 0 | Status: ACTIVE | COMMUNITY
Start: 1900-01-01

## 2022-09-12 RX ORDER — PROMETHAZINE HYDROCHLORIDE 25 MG/1
TABLET ORAL
Qty: 0 | Refills: 0 | Status: ACTIVE | COMMUNITY
Start: 1900-01-01

## 2022-09-12 RX ORDER — PANTOPRAZOLE SODIUM 40 MG/1
1 TABLET TABLET, DELAYED RELEASE ORAL ONCE A DAY
Qty: 180 TABLET | Refills: 3 | Status: ACTIVE | COMMUNITY

## 2022-09-12 RX ORDER — ATORVASTATIN CALCIUM 10 MG/1
1 TABLET TABLET, FILM COATED ORAL ONCE A DAY
Status: ACTIVE | COMMUNITY

## 2022-09-12 RX ORDER — PLECANATIDE 3 MG/1
1 TABLET TABLET ORAL ONCE A DAY
Qty: 90 | Refills: 3 | Status: ACTIVE | COMMUNITY

## 2022-09-12 RX ORDER — DICYCLOMINE HYDROCHLORIDE 10 MG/1
1 TABLET CAPSULE ORAL
Qty: 270 | Refills: 0 | Status: ACTIVE | COMMUNITY
Start: 2022-08-08 | End: 2022-11-06

## 2022-09-12 RX ORDER — METHOTREXATE 2.5 MG/1
AS DIRECTED TABLET ORAL
Status: ACTIVE | COMMUNITY

## 2022-09-12 NOTE — HPI-OTHER HISTORIES
55 yo lady pt of dR Hayde Marinelli. She is a pt of DR Morales and wants a second opinion for IBS. She was diagnosed IBS M she says 15 yrs ago. Says in the last year has been mostly constipation. She has about 2 BMs a week spontaneously, hard stools with straining with incomplete evacuation. Per Dr Morales notes, she took 290 mcg 3 times a week due to diarrhea. 145 mcg took 3-4 days to work. She had a normal colonosocpy per his notes in 2015 and was told to repeat in 10 yrs. "I had some polyps a long long long time ago". SHe had R ,mid abdominal pain. Pain is present when she has to have a BM "I am bent over" and then it lets up a bit she says. Per Dr Morales note, a CT abd pelvis in 7/2018 showing stool in RUQ. SHe sometimes wakes up in the am with nausea and vomiting . Mostly it is retching as she has an empty stomach. She says throwing up "is better than what it was, its just the pain". She takes pantoprazole for acid reflux which is controlled. She takes dicyclomine and carafate and peptobismol as needed. She has not needed carafate in 2 weeks. She takes PPI daily and dicyclomine "all the time". Results of CBC and CMP from 7/2019 were unremarkable.  1/14/20 discussed CT from 7/2019 and US from 12/2019. discussed results in detail. I do not have EGD and colon reports from DR Morales yet. On trulance she did not have any abdominal pain. Likely 2/2 IBS C She ran out of samples and has not been able to afford it at this time.  3/12/20 Here for f/u visit. She had a viral infection recently with vomiting. Went to ER - given Zofran, toradol, morphine. Abdominal pain was so severe in RLQ. She had a CT - unremarkable. Pain is still present "but it dont hurt that bad". C/o  burning in her throat "all the time after the episode". No dysphagia. 3/5/20 CT mild thickening of small bowel loops diffusely with few air fluid levels ? mild enteritis. CBC and CMP unremarkable.  5/21/20 doing "a little better". She has been taking linzess 145 "it had stopped working and then I had to take dulcolax for it to work". My notes state that she had been controlled on Trulance. No notes to indicate that she was switched to Linzess. She will go home, check her meds and let us know what she is on. Dicyclomine helps her pain - but makes her go to sleep so she only takes it at night. She has never taken Amitiza.  9/8/20 Says she has brain cavernous malformations and "they may need to do surgery". Says she takes Trulance 3 mg 2-3 times a week. "it cleans out good". She is here with her Aunt "the brain thing makes stutter sometimes". Pantorpazole controls her reflux  12/10/20 Was recently diagnosed with epilepsy. She cant drive for 6 months. Her  has been very supportive.  Is now  on oxacarbazepine, topiramate, and prednisone. She takes Trulance 3 mg 2-3 times a week, she has a BM qod.  She c/o R flank pain "sometimes I cant walk good, it is so bad it makes me cry".  She was in the ED 12/7 for same.  CT stone protocol stable 2.8 cm liver lesion. ; stable L1 hemangioma in vertebral body.  It was thought msk and she was given a lidoderm patch with improvement.   11/23/21 Here for f.u visit Ran out of Pantoprazole 40 mg which usually controls her reflux Need Trulance also and needs samples/script. Says she wanted also to update me on her health. Still not driving as she kept having seizures. She says brain surgery is being discussed.   5/19/22 Here for f.u visit SAys had brain surgery 1/2022 and has had seizures afterwards. Will  need more surgery WAs in ER about 3 months ago for R sided abd pain. now resolved. [facility].  Ran out of Trulance - needs a script.  Takes Pantoprazole daily which controls her reflux/ Says her recent weight gain makes it imperative that she takes her PPI daily.  Rarely needs to take dicyclomine now  8/8/22 Here for f.u visit with her .  SAys over the past month her PPI has not been working she also ran out 1 weeks ago. She is coughing all night. Says her throat burns with nausea.  Has been taking  a lot of pecid - has to take 2 or more.  No vomiting.  Taking Trulance at night which is working v well. Has doubled up on her epilepsy meds per her MD instructions so she is sleeping all day and lying down all day. After meds and meals included.  9/12/22 Here for f.u visit.  drove her but not at visit Says PPI bid is helping much better. Trying not to lay down a lot especially after eating. Takes pepcid prn. Says has a HA today

## 2022-10-03 ENCOUNTER — OFFICE VISIT (OUTPATIENT)
Dept: URBAN - METROPOLITAN AREA CLINIC 17 | Facility: CLINIC | Age: 57
End: 2022-10-03

## 2023-03-09 ENCOUNTER — OFFICE VISIT (OUTPATIENT)
Dept: URBAN - METROPOLITAN AREA CLINIC 17 | Facility: CLINIC | Age: 58
End: 2023-03-09
Payer: COMMERCIAL

## 2023-03-09 VITALS
WEIGHT: 211 LBS | DIASTOLIC BLOOD PRESSURE: 73 MMHG | SYSTOLIC BLOOD PRESSURE: 127 MMHG | HEIGHT: 64 IN | BODY MASS INDEX: 36.02 KG/M2 | HEART RATE: 81 BPM | TEMPERATURE: 97.1 F

## 2023-03-09 DIAGNOSIS — R11.12 PROJECTILE VOMITING WITH NAUSEA: ICD-10-CM

## 2023-03-09 DIAGNOSIS — K58.1 IRRITABLE BOWEL SYNDROME WITH CONSTIPATION: ICD-10-CM

## 2023-03-09 DIAGNOSIS — R10.84 ABDOMINAL CRAMPING, GENERALIZED: ICD-10-CM

## 2023-03-09 PROBLEM — 93469006: Status: ACTIVE | Noted: 2020-09-08

## 2023-03-09 PROBLEM — 197321007: Status: ACTIVE | Noted: 2020-09-08

## 2023-03-09 PROBLEM — 414916001 OBESITY: Status: ACTIVE | Noted: 2021-11-23

## 2023-03-09 PROBLEM — 266433003: Status: ACTIVE | Noted: 2020-09-08

## 2023-03-09 PROBLEM — 440630006: Status: ACTIVE | Noted: 2020-09-08

## 2023-03-09 PROBLEM — 305058001: Status: ACTIVE | Noted: 2020-09-08

## 2023-03-09 PROBLEM — 162031009: Status: ACTIVE | Noted: 2020-09-08

## 2023-03-09 PROBLEM — 285388000: Status: ACTIVE | Noted: 2020-09-08

## 2023-03-09 PROBLEM — 445355009 REFRACTORY EPILEPSY: Status: ACTIVE | Noted: 2021-11-23

## 2023-03-09 PROCEDURE — 99214 OFFICE O/P EST MOD 30 MIN: CPT | Performed by: INTERNAL MEDICINE

## 2023-03-09 RX ORDER — MUPIROCIN 20 MG/G
OINTMENT TOPICAL
Qty: 0 | Refills: 0 | Status: ON HOLD | COMMUNITY
Start: 1900-01-01

## 2023-03-09 RX ORDER — EPINEPHRINE 0.3 MG/.3ML
INJECT 0.3 MILLILITER (0.3 MG) BY INTRAMUSCULAR ROUTE ONCE AS NEEDED FOR ANAPHYLAXIS INJECTION, SOLUTION INTRAMUSCULAR
Qty: 0 | Refills: 0 | Status: ON HOLD | COMMUNITY
Start: 1900-01-01

## 2023-03-09 RX ORDER — PLECANATIDE 3 MG/1
1 TABLET TABLET ORAL ONCE A DAY
Qty: 90 | Refills: 3

## 2023-03-09 RX ORDER — TOPIRAMATE 25 MG/1
1 TABLET TABLET, FILM COATED ORAL ONCE A DAY
Status: ACTIVE | COMMUNITY

## 2023-03-09 RX ORDER — PROMETHAZINE HYDROCHLORIDE 25 MG/1
TABLET ORAL
Qty: 0 | Refills: 0 | Status: ON HOLD | COMMUNITY
Start: 1900-01-01

## 2023-03-09 RX ORDER — PANTOPRAZOLE SODIUM 40 MG/1
1 TABLET TABLET, DELAYED RELEASE ORAL ONCE A DAY
Qty: 180 TABLET | Refills: 3 | Status: ACTIVE | COMMUNITY

## 2023-03-09 RX ORDER — METHOTREXATE 2.5 MG/1
AS DIRECTED TABLET ORAL
Status: ON HOLD | COMMUNITY

## 2023-03-09 RX ORDER — ATORVASTATIN CALCIUM 10 MG/1
1 TABLET TABLET, FILM COATED ORAL ONCE A DAY
Status: ACTIVE | COMMUNITY

## 2023-03-09 RX ORDER — PANTOPRAZOLE SODIUM 40 MG/1
1 TABLET TABLET, DELAYED RELEASE ORAL ONCE A DAY
Qty: 90 | Refills: 3 | OUTPATIENT
Start: 2023-03-09

## 2023-03-09 RX ORDER — PLECANATIDE 3 MG/1
1 TABLET TABLET ORAL ONCE A DAY
Qty: 90 | Refills: 3 | Status: ON HOLD | COMMUNITY

## 2023-03-09 NOTE — HPI-OTHER HISTORIES
53 yo lady pt of dR Hayde Marinelli. She is a pt of DR Morales and wants a second opinion for IBS. She was diagnosed IBS M she says 15 yrs ago. Says in the last year has been mostly constipation. She has about 2 BMs a week spontaneously, hard stools with straining with incomplete evacuation. Per Dr Morales notes, she took 290 mcg 3 times a week due to diarrhea. 145 mcg took 3-4 days to work. She had a normal colonosocpy per his notes in 2015 and was told to repeat in 10 yrs. "I had some polyps a long long long time ago". SHe had R ,mid abdominal pain. Pain is present when she has to have a BM "I am bent over" and then it lets up a bit she says. Per Dr Morales note, a CT abd pelvis in 7/2018 showing stool in RUQ. SHe sometimes wakes up in the am with nausea and vomiting . Mostly it is retching as she has an empty stomach. She says throwing up "is better than what it was, its just the pain". She takes pantoprazole for acid reflux which is controlled. She takes dicyclomine and carafate and peptobismol as needed. She has not needed carafate in 2 weeks. She takes PPI daily and dicyclomine "all the time". Results of CBC and CMP from 7/2019 were unremarkable.  1/14/20 discussed CT from 7/2019 and US from 12/2019. discussed results in detail. I do not have EGD and colon reports from DR Morales yet. On trulance she did not have any abdominal pain. Likely 2/2 IBS C She ran out of samples and has not been able to afford it at this time.  3/12/20 Here for f/u visit. She had a viral infection recently with vomiting. Went to ER - given Zofran, toradol, morphine. Abdominal pain was so severe in RLQ. She had a CT - unremarkable. Pain is still present "but it dont hurt that bad". C/o  burning in her throat "all the time after the episode". No dysphagia. 3/5/20 CT mild thickening of small bowel loops diffusely with few air fluid levels ? mild enteritis. CBC and CMP unremarkable.  5/21/20 doing "a little better". She has been taking linzess 145 "it had stopped working and then I had to take dulcolax for it to work". My notes state that she had been controlled on Trulance. No notes to indicate that she was switched to Linzess. She will go home, check her meds and let us know what she is on. Dicyclomine helps her pain - but makes her go to sleep so she only takes it at night. She has never taken Amitiza.  9/8/20 Says she has brain cavernous malformations and "they may need to do surgery". Says she takes Trulance 3 mg 2-3 times a week. "it cleans out good". She is here with her Aunt "the brain thing makes stutter sometimes". Pantorpazole controls her reflux  12/10/20 Was recently diagnosed with epilepsy. She cant drive for 6 months. Her  has been very supportive.  Is now  on oxacarbazepine, topiramate, and prednisone. She takes Trulance 3 mg 2-3 times a week, she has a BM qod.  She c/o R flank pain "sometimes I cant walk good, it is so bad it makes me cry".  She was in the ED 12/7 for same.  CT stone protocol stable 2.8 cm liver lesion. ; stable L1 hemangioma in vertebral body.  It was thought msk and she was given a lidoderm patch with improvement.   11/23/21 Here for f.u visit Ran out of Pantoprazole 40 mg which usually controls her reflux Need Trulance also and needs samples/script. Says she wanted also to update me on her health. Still not driving as she kept having seizures. She says brain surgery is being discussed.   5/19/22 Here for f.u visit SAys had brain surgery 1/2022 and has had seizures afterwards. Will  need more surgery WAs in ER about 3 months ago for R sided abd pain. now resolved. [facility] Says CT was negative.  Ran out of Trulance - needs a script.  Takes Pantoprazole daily which controls her reflux/ Says her recent weight gain makes it imperative that she takes her PPI daily.  Rarely needs to take dicyclomine now  8/8/22 Here for f.u visit with her .  SAys over the past month her PPI has not been working she also ran out 1 weeks ago. She is coughing all night. Says her throat burns with nausea.  Has been taking  a lot of pecid - has to take 2 or more.  No vomiting.  Taking Trulance at night which is working v well. Has doubled up on her epilepsy meds per her MD instructions so she is sleeping all day and lying down all day. After meds and meals included.  9/12/22 Here for f.u visit.  drove her but not at visit Says PPI bid is helping much better. Trying not to lay down a lot especially after eating. Takes pepcid prn. Says has a HA today  3/9/23 Here for routine f/u visit Doing some testing for her L rotator cuff mzl. SAys her stomach makes a lot of noise when I asked how her stomach is doing Take Pantoprazole 40 mg daily.  Says she is irregular with BMs.  Says her constipation medicine works very well when she takes it.

## 2023-07-28 ENCOUNTER — TELEPHONE ENCOUNTER (OUTPATIENT)
Dept: URBAN - METROPOLITAN AREA CLINIC 105 | Facility: CLINIC | Age: 58
End: 2023-07-28

## 2023-07-28 RX ORDER — PANTOPRAZOLE SODIUM 40 MG/1
1 TABLET TABLET, DELAYED RELEASE ORAL ONCE A DAY
Qty: 90 | Refills: 0
Start: 2023-03-09

## 2023-07-31 ENCOUNTER — TELEPHONE ENCOUNTER (OUTPATIENT)
Dept: URBAN - METROPOLITAN AREA CLINIC 105 | Facility: CLINIC | Age: 58
End: 2023-07-31

## 2023-08-01 ENCOUNTER — TELEPHONE ENCOUNTER (OUTPATIENT)
Dept: URBAN - METROPOLITAN AREA CLINIC 105 | Facility: CLINIC | Age: 58
End: 2023-08-01

## 2023-08-01 RX ORDER — LANSOPRAZOLE 30 MG/1
1 CAPSULE BEFORE A MEAL CAPSULE, DELAYED RELEASE ORAL ONCE A DAY
Qty: 90 CAPSULE | Refills: 3 | OUTPATIENT
Start: 2023-08-01

## 2023-08-28 ENCOUNTER — OFFICE VISIT (OUTPATIENT)
Dept: URBAN - METROPOLITAN AREA CLINIC 17 | Facility: CLINIC | Age: 58
End: 2023-08-28
Payer: COMMERCIAL

## 2023-08-28 VITALS
BODY MASS INDEX: 36.37 KG/M2 | WEIGHT: 213 LBS | HEART RATE: 79 BPM | TEMPERATURE: 97.4 F | HEIGHT: 64 IN | SYSTOLIC BLOOD PRESSURE: 109 MMHG | DIASTOLIC BLOOD PRESSURE: 72 MMHG

## 2023-08-28 DIAGNOSIS — R11.12 PROJECTILE VOMITING WITH NAUSEA: ICD-10-CM

## 2023-08-28 DIAGNOSIS — K58.1 IRRITABLE BOWEL SYNDROME WITH CONSTIPATION: ICD-10-CM

## 2023-08-28 DIAGNOSIS — D18.03 HEPATIC HEMANGIOMA: ICD-10-CM

## 2023-08-28 DIAGNOSIS — R10.84 ABDOMINAL CRAMPING, GENERALIZED: ICD-10-CM

## 2023-08-28 DIAGNOSIS — K76.0 HEPATIC STEATOSIS: ICD-10-CM

## 2023-08-28 DIAGNOSIS — Z12.11 SCREEN FOR COLON CANCER: ICD-10-CM

## 2023-08-28 DIAGNOSIS — E66.9 OBESITY (BMI 30-39.9): ICD-10-CM

## 2023-08-28 DIAGNOSIS — G40.919 INTRACTABLE EPILEPSY WITHOUT STATUS EPILEPTICUS, UNSPECIFIED EPILEPSY TYPE: ICD-10-CM

## 2023-08-28 DIAGNOSIS — R10.13 DYSPEPSIA: ICD-10-CM

## 2023-08-28 PROBLEM — 236069009: Status: ACTIVE | Noted: 2020-09-08

## 2023-08-28 PROCEDURE — 99213 OFFICE O/P EST LOW 20 MIN: CPT | Performed by: INTERNAL MEDICINE

## 2023-08-28 RX ORDER — MUPIROCIN 20 MG/G
OINTMENT TOPICAL
Qty: 0 | Refills: 0 | Status: ON HOLD | COMMUNITY
Start: 1900-01-01

## 2023-08-28 RX ORDER — METHOTREXATE 2.5 MG/1
AS DIRECTED TABLET ORAL
Status: ON HOLD | COMMUNITY

## 2023-08-28 RX ORDER — PLECANATIDE 3 MG/1
1 TABLET TABLET ORAL ONCE A DAY
Qty: 90 | Refills: 3 | Status: ACTIVE | COMMUNITY

## 2023-08-28 RX ORDER — PANTOPRAZOLE SODIUM 40 MG/1
1 TABLET TABLET, DELAYED RELEASE ORAL ONCE A DAY
Qty: 180 TABLET | Refills: 3 | Status: ACTIVE | COMMUNITY

## 2023-08-28 RX ORDER — LAMOTRIGINE 200 MG/1
1 TABLET TABLET ORAL ONCE A DAY
Status: ACTIVE | COMMUNITY

## 2023-08-28 RX ORDER — ATORVASTATIN CALCIUM 10 MG/1
1 TABLET TABLET, FILM COATED ORAL ONCE A DAY
Status: ON HOLD | COMMUNITY

## 2023-08-28 RX ORDER — PLECANATIDE 3 MG/1
1 TABLET TABLET ORAL ONCE A DAY
Qty: 90 | Refills: 3

## 2023-08-28 RX ORDER — PROMETHAZINE HYDROCHLORIDE 25 MG/1
TABLET ORAL
Qty: 0 | Refills: 0 | Status: ON HOLD | COMMUNITY
Start: 1900-01-01

## 2023-08-28 RX ORDER — PANTOPRAZOLE SODIUM 40 MG/1
1 TABLET TABLET, DELAYED RELEASE ORAL ONCE A DAY
Qty: 90 | Refills: 0 | Status: ACTIVE | COMMUNITY
Start: 2023-03-09

## 2023-08-28 RX ORDER — LANSOPRAZOLE 30 MG/1
1 CAPSULE BEFORE A MEAL CAPSULE, DELAYED RELEASE ORAL ONCE A DAY
Qty: 90 CAPSULE | Refills: 3 | Status: ACTIVE | COMMUNITY
Start: 2023-08-01

## 2023-08-28 RX ORDER — EPINEPHRINE 0.3 MG/.3ML
INJECT 0.3 MILLILITER (0.3 MG) BY INTRAMUSCULAR ROUTE ONCE AS NEEDED FOR ANAPHYLAXIS INJECTION, SOLUTION INTRAMUSCULAR
Qty: 0 | Refills: 0 | Status: ON HOLD | COMMUNITY
Start: 1900-01-01

## 2023-08-28 RX ORDER — PANTOPRAZOLE SODIUM 40 MG/1
1 TABLET TABLET, DELAYED RELEASE ORAL ONCE A DAY
Qty: 90 | Refills: 3 | OUTPATIENT

## 2023-08-28 RX ORDER — TOPIRAMATE 25 MG/1
1 TABLET TABLET, FILM COATED ORAL ONCE A DAY
Status: ACTIVE | COMMUNITY

## 2023-08-28 NOTE — HPI-OTHER HISTORIES
55 yo lady pt of dR Hayde Marinelli. She is a pt of DR Morales and wants a second opinion for IBS. She was diagnosed IBS M she says 15 yrs ago. Says in the last year has been mostly constipation. She has about 2 BMs a week spontaneously, hard stools with straining with incomplete evacuation. Per Dr Morales notes, she took 290 mcg 3 times a week due to diarrhea. 145 mcg took 3-4 days to work. She had a normal colonosocpy per his notes in 2015 and was told to repeat in 10 yrs. "I had some polyps a long long long time ago". SHe had R ,mid abdominal pain. Pain is present when she has to have a BM "I am bent over" and then it lets up a bit she says. Per Dr Morales note, a CT abd pelvis in 7/2018 showing stool in RUQ. SHe sometimes wakes up in the am with nausea and vomiting . Mostly it is retching as she has an empty stomach. She says throwing up "is better than what it was, its just the pain". She takes pantoprazole for acid reflux which is controlled. She takes dicyclomine and carafate and peptobismol as needed. She has not needed carafate in 2 weeks. She takes PPI daily and dicyclomine "all the time". Results of CBC and CMP from 7/2019 were unremarkable.  1/14/20 discussed CT from 7/2019 and US from 12/2019. discussed results in detail. I do not have EGD and colon reports from DR Morales yet. On trulance she did not have any abdominal pain. Likely 2/2 IBS C She ran out of samples and has not been able to afford it at this time.  3/12/20 Here for f/u visit. She had a viral infection recently with vomiting. Went to ER - given Zofran, toradol, morphine. Abdominal pain was so severe in RLQ. She had a CT - unremarkable. Pain is still present "but it dont hurt that bad". C/o  burning in her throat "all the time after the episode". No dysphagia. 3/5/20 CT mild thickening of small bowel loops diffusely with few air fluid levels ? mild enteritis. CBC and CMP unremarkable.  5/21/20 doing "a little better". She has been taking linzess 145 "it had stopped working and then I had to take dulcolax for it to work". My notes state that she had been controlled on Trulance. No notes to indicate that she was switched to Linzess. She will go home, check her meds and let us know what she is on. Dicyclomine helps her pain - but makes her go to sleep so she only takes it at night. She has never taken Amitiza.  9/8/20 Says she has brain cavernous malformations and "they may need to do surgery". Says she takes Trulance 3 mg 2-3 times a week. "it cleans out good". She is here with her Aunt "the brain thing makes stutter sometimes". Pantorpazole controls her reflux  12/10/20 Was recently diagnosed with epilepsy. She cant drive for 6 months. Her  has been very supportive.  Is now  on oxacarbazepine, topiramate, and prednisone. She takes Trulance 3 mg 2-3 times a week, she has a BM qod.  She c/o R flank pain "sometimes I cant walk good, it is so bad it makes me cry".  She was in the ED 12/7 for same.  CT stone protocol stable 2.8 cm liver lesion. ; stable L1 hemangioma in vertebral body.  It was thought msk and she was given a lidoderm patch with improvement.   11/23/21 Here for f.u visit Ran out of Pantoprazole 40 mg which usually controls her reflux Need Trulance also and needs samples/script. Says she wanted also to update me on her health. Still not driving as she kept having seizures. She says brain surgery is being discussed.   5/19/22 Here for f.u visit SAys had brain surgery 1/2022 and has had seizures afterwards. Will  need more surgery WAs in ER about 3 months ago for R sided abd pain. now resolved. [facility] CT was [facility].  Ran out of Trulance - needs a script.  Takes Pantoprazole daily which controls her reflux/ Says her recent weight gain makes it imperative that she takes her PPI daily.  Rarely needs to take dicyclomine now  8/8/22 Here for f.u visit with her .  SAys over the past month her PPI has not been working she also ran out 1 weeks ago. She is coughing all night. Says her throat burns with nausea.  Has been taking  a lot of pecid - has to take 2 or more.  No vomiting.  Taking Trulance at night which is working v well. Has doubled up on her epilepsy meds per her MD instructions so she is sleeping all day and lying down all day. After meds and meals included.  9/12/22 Here for f.u visit.  drove her but not at visit Says PPI bid is helping much better. Trying not to lay down a lot especially after eating. Takes pepcid prn. Says has a HA today  3/9/23 Here for routine f/u visit Doing some testing for her L rotator cuff mzl. SAys her stomach makes a lot of noise when I asked how her stomach is doing Take Pantoprazole 40 mg daily.  Says she is irregular with BMs.  Says her constipation medicine works very well when she takes it.  8/28/23 Her lamotrigrine has been increased to 200 mg for her epilepsy - says seizures are worse and she has been going to her ER.  Alternates from constipation and loose stools. Takes Trulance qod 'says because it burns my booty hole".

## 2023-09-07 ENCOUNTER — OFFICE VISIT (OUTPATIENT)
Dept: URBAN - METROPOLITAN AREA CLINIC 17 | Facility: CLINIC | Age: 58
End: 2023-09-07

## 2023-10-25 ENCOUNTER — TELEPHONE ENCOUNTER (OUTPATIENT)
Dept: URBAN - METROPOLITAN AREA CLINIC 3 | Facility: CLINIC | Age: 58
End: 2023-10-25

## 2023-10-25 RX ORDER — ONDANSETRON HYDROCHLORIDE 4 MG/1
1 TABLET TABLET, FILM COATED ORAL
Qty: 30 | Refills: 0 | OUTPATIENT
Start: 2023-10-25

## 2023-11-09 ENCOUNTER — OFFICE VISIT (OUTPATIENT)
Dept: URBAN - METROPOLITAN AREA CLINIC 17 | Facility: CLINIC | Age: 58
End: 2023-11-09
Payer: COMMERCIAL

## 2023-11-09 VITALS
WEIGHT: 210.6 LBS | DIASTOLIC BLOOD PRESSURE: 71 MMHG | HEART RATE: 74 BPM | HEIGHT: 64 IN | SYSTOLIC BLOOD PRESSURE: 107 MMHG | TEMPERATURE: 97.8 F | BODY MASS INDEX: 35.96 KG/M2

## 2023-11-09 DIAGNOSIS — K58.1 IRRITABLE BOWEL SYNDROME WITH CONSTIPATION: ICD-10-CM

## 2023-11-09 DIAGNOSIS — R11.12 PROJECTILE VOMITING WITH NAUSEA: ICD-10-CM

## 2023-11-09 DIAGNOSIS — K21.00 ALKALINE REFLUX ESOPHAGITIS: ICD-10-CM

## 2023-11-09 DIAGNOSIS — R10.9 RIGHT SIDED ABDOMINAL PAIN: ICD-10-CM

## 2023-11-09 PROBLEM — 8579004: Status: ACTIVE | Noted: 2020-09-08

## 2023-11-09 PROCEDURE — 99214 OFFICE O/P EST MOD 30 MIN: CPT | Performed by: INTERNAL MEDICINE

## 2023-11-09 RX ORDER — METHOTREXATE 2.5 MG/1
AS DIRECTED TABLET ORAL
Status: ACTIVE | COMMUNITY

## 2023-11-09 RX ORDER — ONDANSETRON HYDROCHLORIDE 4 MG/1
1 TABLET TABLET, FILM COATED ORAL
Qty: 30 | Refills: 0 | Status: ON HOLD | COMMUNITY
Start: 2023-10-25

## 2023-11-09 RX ORDER — EPINEPHRINE 0.3 MG/.3ML
INJECT 0.3 MILLILITER (0.3 MG) BY INTRAMUSCULAR ROUTE ONCE AS NEEDED FOR ANAPHYLAXIS INJECTION, SOLUTION INTRAMUSCULAR
Qty: 0 | Refills: 0 | Status: ON HOLD | COMMUNITY
Start: 1900-01-01

## 2023-11-09 RX ORDER — LANSOPRAZOLE 30 MG/1
1 CAPSULE BEFORE A MEAL CAPSULE, DELAYED RELEASE ORAL ONCE A DAY
Qty: 90 CAPSULE | Refills: 3 | Status: ON HOLD | COMMUNITY
Start: 2023-08-01

## 2023-11-09 RX ORDER — ALBUTEROL SULFATE 90 UG/1
1 PUFF AS NEEDED AEROSOL, METERED RESPIRATORY (INHALATION)
Status: ACTIVE | COMMUNITY

## 2023-11-09 RX ORDER — MUPIROCIN 20 MG/G
OINTMENT TOPICAL
Qty: 0 | Refills: 0 | Status: ON HOLD | COMMUNITY
Start: 1900-01-01

## 2023-11-09 RX ORDER — LANSOPRAZOLE 30 MG/1
1 CAPSULE BEFORE A MEAL CAPSULE, DELAYED RELEASE ORAL ONCE A DAY
Status: ACTIVE | COMMUNITY

## 2023-11-09 RX ORDER — PANTOPRAZOLE SODIUM 40 MG/1
1 TABLET TABLET, DELAYED RELEASE ORAL ONCE A DAY
Qty: 180 TABLET | Refills: 3 | Status: ON HOLD | COMMUNITY

## 2023-11-09 RX ORDER — TOPIRAMATE 25 MG/1
1 TABLET TABLET, FILM COATED ORAL ONCE A DAY
Status: ACTIVE | COMMUNITY

## 2023-11-09 RX ORDER — PANTOPRAZOLE SODIUM 40 MG/1
1 TABLET TABLET, DELAYED RELEASE ORAL ONCE A DAY
Qty: 90 | Refills: 3 | Status: ON HOLD | COMMUNITY

## 2023-11-09 RX ORDER — LAMOTRIGINE 200 MG/1
1 TABLET TABLET ORAL ONCE A DAY
Status: ACTIVE | COMMUNITY

## 2023-11-09 RX ORDER — ATORVASTATIN CALCIUM 10 MG/1
1 TABLET TABLET, FILM COATED ORAL ONCE A DAY
Status: ACTIVE | COMMUNITY

## 2023-11-09 RX ORDER — LANSOPRAZOLE 30 MG/1
1 CAPSULE BEFORE A MEAL CAPSULE, DELAYED RELEASE ORAL TWICE A DAY
Qty: 180 CAPSULE | Refills: 3 | OUTPATIENT
Start: 2023-11-09

## 2023-11-09 RX ORDER — PROMETHAZINE HYDROCHLORIDE 25 MG/1
TABLET ORAL
Qty: 0 | Refills: 0 | Status: ON HOLD | COMMUNITY
Start: 1900-01-01

## 2023-11-09 RX ORDER — PLECANATIDE 3 MG/1
1 TABLET TABLET ORAL ONCE A DAY
Qty: 90 | Refills: 3 | Status: ACTIVE | COMMUNITY

## 2023-11-09 NOTE — HPI-OTHER HISTORIES
53 yo lady pt of dR Hayde Marinelli. She is a pt of DR Morales and wants a second opinion for IBS. She was diagnosed IBS M she says 15 yrs ago. Says in the last year has been mostly constipation. She has about 2 BMs a week spontaneously, hard stools with straining with incomplete evacuation. Per Dr Morales notes, she took 290 mcg 3 times a week due to diarrhea. 145 mcg took 3-4 days to work. She had a normal colonosocpy per his notes in 2015 and was told to repeat in 10 yrs. "I had some polyps a long long long time ago". SHe had R ,mid abdominal pain. Pain is present when she has to have a BM "I am bent over" and then it lets up a bit she says. Per Dr Morales note, a CT abd pelvis in 7/2018 showing stool in RUQ. SHe sometimes wakes up in the am with nausea and vomiting . Mostly it is retching as she has an empty stomach. She says throwing up "is better than what it was, its just the pain". She takes pantoprazole for acid reflux which is controlled. She takes dicyclomine and carafate and peptobismol as needed. She has not needed carafate in 2 weeks. She takes PPI daily and dicyclomine "all the time". Results of CBC and CMP from 7/2019 were unremarkable.  1/14/20 discussed CT from 7/2019 and US from 12/2019. discussed results in detail. I do not have EGD and colon reports from DR Morales yet. On trulance she did not have any abdominal pain. Likely 2/2 IBS C She ran out of samples and has not been able to afford it at this time.  3/12/20 Here for f/u visit. She had a viral infection recently with vomiting. Went to ER - given Zofran, toradol, morphine. Abdominal pain was so severe in RLQ. She had a CT - unremarkable. Pain is still present "but it dont hurt that bad". C/o  burning in her throat "all the time after the episode". No dysphagia. 3/5/20 CT mild thickening of small bowel loops diffusely with few air fluid levels ? mild enteritis. CBC and CMP unremarkable.  5/21/20 doing "a little better". She has been taking linzess 145 "it had stopped working and then I had to take dulcolax for it to work". My notes state that she had been controlled on Trulance. No notes to indicate that she was switched to Linzess. She will go home, check her meds and let us know what she is on. Dicyclomine helps her pain - but makes her go to sleep so she only takes it at night. She has never taken Amitiza.  9/8/20 Says she has brain cavernous malformations and "they may need to do surgery". Says she takes Trulance 3 mg 2-3 times a week. "it cleans out good". She is here with her Aunt "the brain thing makes stutter sometimes". Pantorpazole controls her reflux  12/10/20 Was recently diagnosed with epilepsy. She cant drive for 6 months. Her  has been very supportive.  Is now  on oxacarbazepine, topiramate, and prednisone. She takes Trulance 3 mg 2-3 times a week, she has a BM qod.  She c/o R flank pain "sometimes I cant walk good, it is so bad it makes me cry".  She was in the ED 12/7 for same.  CT stone protocol stable 2.8 cm liver lesion. ; stable L1 hemangioma in vertebral body.  It was thought msk and she was given a lidoderm patch with improvement.   11/23/21 Here for f.u visit Ran out of Pantoprazole 40 mg which usually controls her reflux Need Trulance also and needs samples/script. Says she wanted also to update me on her health. Still not driving as she kept having seizures. She says brain surgery is being discussed.   5/19/22 Here for f.u visit SAys had brain surgery 1/2022 and has had seizures afterwards. Will  need more surgery WAs in ER about 3 months ago for R sided abd pain. now resolved. [facility].  Ran out of Trulance - needs a script.  Takes Pantoprazole daily which controls her reflux/ Says her recent weight gain makes it imperative that she takes her PPI daily.  Rarely needs to take dicyclomine now  8/8/22 Here for f.u visit with her .  SAys over the past month her PPI has not been working she also ran out 1 weeks ago. She is coughing all night. Says her throat burns with nausea.  Has been taking  a lot of pecid - has to take 2 or more.  No vomiting.  Taking Trulance at night which is working v well. Has doubled up on her epilepsy meds per her MD instructions so she is sleeping all day and lying down all day. After meds and meals included.  9/12/22 Here for f.u visit.  drove her but not at visit Says PPI bid is helping much better. Trying not to lay down a lot especially after eating. Takes pepcid prn. Says has a HA today  3/9/23 Here for routine f/u visit Doing some testing for her L rotator cuff mzl. SAys her stomach makes a lot of noise when I asked how her stomach is doing Take Pantoprazole 40 mg daily.  Says she is irregular with BMs.  Says her constipation medicine works very well when she takes it.  8/28/23 Her lamotrigrine has been increased to 200 mg for her epilepsy - says seizures are worse and she has been going to her ER.  Alternates from constipation and loose stools. Takes Trulance qod 'says because it burns my booty hole".  11/9/23 Her father recently passed away. Says " I need an endoscopy, I feel like everything gets caught in my throat and then I thow up then when I lay down it burns and gets hot and I feel like I am going to puke" Takes lansoprazole 30 mg in the AM. Taking 3 hours before eating.  EAting dinner at 6 pm usually. Snacks till 8/.30-9pm. lays down about 10 pm.  Trulance is working has a BM daily.

## 2023-12-08 ENCOUNTER — TELEPHONE ENCOUNTER (OUTPATIENT)
Dept: URBAN - METROPOLITAN AREA CLINIC 105 | Facility: CLINIC | Age: 58
End: 2023-12-08

## 2023-12-08 ENCOUNTER — LAB OUTSIDE AN ENCOUNTER (OUTPATIENT)
Dept: URBAN - METROPOLITAN AREA CLINIC 105 | Facility: CLINIC | Age: 58
End: 2023-12-08

## 2024-01-15 ENCOUNTER — TELEPHONE ENCOUNTER (OUTPATIENT)
Dept: URBAN - METROPOLITAN AREA CLINIC 86 | Facility: CLINIC | Age: 59
End: 2024-01-15

## 2024-01-15 PROBLEM — 93469006: Status: ACTIVE | Noted: 2024-01-15

## 2024-01-15 PROBLEM — 300331000: Status: ACTIVE | Noted: 2024-01-15

## 2024-01-15 PROBLEM — 128613002: Status: ACTIVE | Noted: 2024-01-15

## 2024-01-15 PROBLEM — 443913008: Status: ACTIVE | Noted: 2024-01-15

## 2024-01-15 NOTE — HPI-TODAY'S VISIT:
Is listed as being a 58-year-old female who was previously seen Dr. Fernandez in our group and now comes in to be seen for liver lesions.Patient listed as having a past medical history of appendectomy, hysterectomy, duodenitis, hepatic hemangioma, and IBS. In the past has been treated with dicyclomine and Phenergan by her primary GI. Patient also mentions having seizure disorder and cavernous malformation history.  Lumbar radiculopathy and cervical disc disease as well as hiatal hernia. Other history included carpal tunnel, endometriosis, family history of breast cancer, fibrocystic breast disease, GERD, migraine, plantar fasciitis. Other surgeries include brain surgery, , left knee surgery, colonoscopy and upper endoscopy.  Endometrial ablation, hysterectomy, shoulder surgery, total abdominal hysterectomy and bilateral salpingo-oophorectomy. Family history of sister with breast cancer and another sister with leukemia.  Mother with history of heart disease cervical cancer hypertension arthritis. Social history not a smoker not a drinker. Medicines listed included albuterol 90 mcg inhaler as needed, dicyclomine 10 mg as needed, Flonase 50 mcg as needed.  Lamictal 100 mg twice a day, Trileptal 300 mg twice a day, pantoprazole 40 mL a day, Trulance 3 mg a day, Phenergan 25 mg as needed. Allergies to aspirin, Cipro, Keppra which cause itching, and IV iodine contrast which caused a rash.  She previously had a CT contrast rash reaction responded to Benadryl. Weight when last seen at [facility] had been 101.2 kg with a BMI of 38.28. Labs then showed RBC count 3.73, neutrophils 4.9, creatinine 1.05 chloride 111 bilirubin 0.2. CT scan in 2022 ER visit showed no acute malady in the abdomen or pelvis but there was a 2.1 cm hypodensity in the posterior right lobe which was felt unchanged versus 2020 while incomplete characterize could be hemangioma. CT head at the time showed a subcentimeter hypodensity in the left medial temporal lobe that was seen in 2020 that appeared to be slightly decreased in size and may have been suspicious for an underlying cavernous transformation area.  No gray-white loss seen and no hydrocephalus. Patient had EGD 2020 that showed no gross lesions and few diminutive sessile polyps with no stigmata of recent bleeding in the stomach with a polyp removed with cold forceps.  Grade B esophagitis was seen and 2 cm hiatal hernia.  Path report showedt fundic gland polyp and no H. pylori. [facility] 2020 labs showed WBC 12.6, hemoglobin 13.1 platelet count 281 MCV 96.2 neutrophils 8.6 elevated lymphocytes 3.0 glucose 93 BUN 25 creatinine 1.16 sodium 137 potassium 4.4 calcium 8.4 AST 18 ALT 18 alk phos 91 bilirubin 0.3 AGA ultrasound 2020 showed liver echogenicity increased suggesting fatty infiltration in the well-circumscribed hypoechoic lesion in the right lobe measuring 1.9 x 2.6 x 2 cm and approximately stable versus 2019 and allowing for slight differences in technique.  No gallstones seen.  Common bile duct 2 mm.  Partially visualized pancreas unremarkable.  Right kidney 10.2 cm with no hydronephrosis.  They recommended further characterization with MRI if not previously performed. CT stone protocol done in 2020 showing stable 2.8 cm hypodense lesion posterior right lobe of the liver likely benign.  Gallbladder biliary tree normal and spleen normal.  Appendix not well-seen however no right lower quadrant inflammatory changes seen.  Pancreas normal as were adrenal glands and kidneys.  No aggressive osseous lesion but likely stable hemangioma at the lumbar 1 vertebral body also seen.  No renal stones. 1.  Abnormal ultrasound of the abdomen with liver lesions seen in the past felt likely hemangioma but not well-characterized by last imaging and MRI recommended but does not appear the patient had it done.  Last imaging in 2020 suggesting that the lesion was likely hemangioma but needing to have further clarification of same.  Needs labs to see if patient can do scant.  History of iodine allergy but not to MRI contrast. 2.  Fatty liver suggested on imaging.  Monitor on same.  Has risk factors for same in the form of obesity working on same can be quite helpful.  MRI if done here would be more helpful and that would tell us about the liver fat quant which is not done at [facility].  Patient needs to work on risk factors for same which including losing 5 to 10% weight, exercise cumulative 150 to 300 minutes a week. 3.  Suspected liver hemangioma as above and needing further imaging to confirm same. 4.  High risk medicine usage noted on seizures medication but stable as of last check. 5.  Seizure disorder managed by local providers. 6.  Irritable bowel syndrome and sees local GI for same. 7.  GERD sees local GI for same and on PPI. 8.  Obesity noted and needs to work on same and losing 5 to 10% can be quite helpful for same. 9.  Vaccine counseling check for hepatitis A/B status and if negative consider vaccine.Plan 1.  Needs labs.2.  Check for hepatitis AMB status.  Check hep C as well.  Consider vaccine for hep AMB as needed. 3.  Needs MRI to assess liver lesions.  If can try to do at [facility] since they have baseline prior labs. 4.  Unclear if patient has fatty liver not does not mention CTs and typically CTs can see so as well. 5.  Reassess post above.  Duration of the visit was minutes with 10 minutes of chart prep and 20 minutes of face to face/TeleMed visit with time spent reviewing historical and recent records, discussing their current status relative to same and reviewing future plans for the patient.

## 2024-01-18 ENCOUNTER — OFFICE VISIT (OUTPATIENT)
Dept: URBAN - METROPOLITAN AREA CLINIC 86 | Facility: CLINIC | Age: 59
End: 2024-01-18
Payer: COMMERCIAL

## 2024-01-18 VITALS
TEMPERATURE: 96.4 F | SYSTOLIC BLOOD PRESSURE: 125 MMHG | BODY MASS INDEX: 36.02 KG/M2 | WEIGHT: 211 LBS | DIASTOLIC BLOOD PRESSURE: 73 MMHG | HEIGHT: 64 IN | HEART RATE: 79 BPM

## 2024-01-18 DIAGNOSIS — K76.9 LIVER LESION: ICD-10-CM

## 2024-01-18 DIAGNOSIS — R93.5 ABNORMAL ULTRASOUND OF ABDOMEN: ICD-10-CM

## 2024-01-18 DIAGNOSIS — D18.03 LIVER HEMANGIOMA: ICD-10-CM

## 2024-01-18 DIAGNOSIS — K76.0 HEPATIC STEATOSIS: ICD-10-CM

## 2024-01-18 PROCEDURE — 99245 OFF/OP CONSLTJ NEW/EST HI 55: CPT

## 2024-01-18 PROCEDURE — 99205 OFFICE O/P NEW HI 60 MIN: CPT

## 2024-01-18 RX ORDER — ALBUTEROL SULFATE 90 UG/1
1 PUFF AS NEEDED AEROSOL, METERED RESPIRATORY (INHALATION)
Status: ACTIVE | COMMUNITY

## 2024-01-18 RX ORDER — MUPIROCIN 20 MG/G
OINTMENT TOPICAL
Qty: 0 | Refills: 0 | Status: DISCONTINUED | COMMUNITY
Start: 1900-01-01

## 2024-01-18 RX ORDER — PLECANATIDE 3 MG/1
1 TABLET TABLET ORAL ONCE A DAY
Qty: 90 | Refills: 3 | Status: ACTIVE | COMMUNITY

## 2024-01-18 RX ORDER — TRAMADOL HYDROCHLORIDE 50 MG/1
1 TABLET AS NEEDED TABLET, FILM COATED ORAL
Status: ACTIVE | COMMUNITY

## 2024-01-18 RX ORDER — LANSOPRAZOLE 30 MG/1
1 CAPSULE BEFORE A MEAL CAPSULE, DELAYED RELEASE ORAL ONCE A DAY
Status: DISCONTINUED | COMMUNITY

## 2024-01-18 RX ORDER — METHOTREXATE 2.5 MG/1
4 TABLET ORAL
Status: ACTIVE | COMMUNITY

## 2024-01-18 RX ORDER — FLUTICASONE PROPIONATE 50 UG/1
1 SPRAY IN EACH NOSTRIL SPRAY, METERED NASAL ONCE A DAY
Status: ACTIVE | COMMUNITY

## 2024-01-18 RX ORDER — LANSOPRAZOLE 30 MG/1
1 CAPSULE BEFORE A MEAL CAPSULE, DELAYED RELEASE ORAL TWICE A DAY
Qty: 180 CAPSULE | Refills: 3 | Status: DISCONTINUED | COMMUNITY
Start: 2023-11-09

## 2024-01-18 RX ORDER — PROMETHAZINE HYDROCHLORIDE 25 MG/1
TABLET ORAL
Qty: 0 | Refills: 0 | Status: ACTIVE | COMMUNITY
Start: 1900-01-01

## 2024-01-18 RX ORDER — ATORVASTATIN CALCIUM 10 MG/1
1 TABLET TABLET, FILM COATED ORAL ONCE A DAY
Status: ACTIVE | COMMUNITY

## 2024-01-18 RX ORDER — EPINEPHRINE 0.3 MG/.3ML
INJECT 0.3 MILLILITER (0.3 MG) BY INTRAMUSCULAR ROUTE ONCE AS NEEDED FOR ANAPHYLAXIS INJECTION, SOLUTION INTRAMUSCULAR
Qty: 0 | Refills: 0 | Status: DISCONTINUED | COMMUNITY
Start: 1900-01-01

## 2024-01-18 RX ORDER — DICYCLOMINE HYDROCHLORIDE 10 MG/1
2 CAPSULES CAPSULE ORAL THREE TIMES A DAY
Status: ACTIVE | COMMUNITY

## 2024-01-18 RX ORDER — ONDANSETRON HYDROCHLORIDE 4 MG/1
1 TABLET TABLET, FILM COATED ORAL
Qty: 30 | Refills: 0 | COMMUNITY
Start: 2023-10-25

## 2024-01-18 RX ORDER — LAMOTRIGINE 100 MG/1
1 TABLET TABLET, ORALLY DISINTEGRATING ORAL TWICE A DAY
Status: ACTIVE | COMMUNITY

## 2024-01-18 RX ORDER — PANTOPRAZOLE SODIUM 40 MG/1
1 TABLET TABLET, DELAYED RELEASE ORAL ONCE A DAY
Status: ACTIVE | COMMUNITY

## 2024-01-18 RX ORDER — FOLIC ACID 1 MG/1
1 TABLET TABLET ORAL ONCE A DAY
Status: ACTIVE | COMMUNITY

## 2024-01-18 RX ORDER — TOPIRAMATE 100 MG/1
1 IN AND 2 IN PM TABLET, FILM COATED ORAL TWICE A DAY
Status: ACTIVE | COMMUNITY

## 2024-01-18 RX ORDER — SUMATRIPTAN SUCCINATE 100 MG/1
1 TABLET AT LEAST 2 HOURS BETWEEN DOSES AS NEEDED TABLET, FILM COATED ORAL TWICE A DAY
Status: ACTIVE | COMMUNITY

## 2024-01-18 RX ORDER — CENOBAMATE 50 MG/1
1 TABLET TABLET, FILM COATED ORAL ONCE A DAY
Status: ACTIVE | COMMUNITY

## 2024-01-18 NOTE — HPI-TODAY'S VISIT:
Pt is listed as being a 58-year-old female who was previously seen Dr. Fernandez in our group and sees DR Fleming for primary care and  now comes in to be seen for liver lesions.  A copy of the note will be sent to the referring provider.  Has epiliepsy and she sees [facility] and she Nov/Dec  2023. That raised the concern re the liver.  Notes reviewed aga and healow:   Patient listed as having a past medical history of appendectomy, hysterectomy, duodenitis, hepatic hemangioma, and IBS.  In the past has been treated with dicyclomine and Phenergan by her primary GI.  Patient also mentions having seizure disorder and cavernous malformation history. Followed by [facility] for that and last seizure recent and Tuesday of this week. She has them often and mainly at night.  Lumbar radiculopathy and cervical disc disease as well as hiatal hernia.  Other history included carpal tunnel, endometriosis, family history of breast cancer, fibrocystic breast disease, GERD, migraine, plantar fasciitis.  Other surgeries include brain surgery, , left knee surgery, colonoscopy and upper endoscopy.  Endometrial ablation, hysterectomy, shoulder surgery, total abdominal hysterectomy and bilateral salpingo-oophorectomy. Foot surgery.  Family history of sister with breast cancer and leukemia and she passed. Mother with history of heart disease cervical cancer hypertension arthritis.  Social history not a smoker not a drinker.  Medicines listed included albuterol 90 mcg inhaler as needed, dicyclomine 10 mg as needed, Flonase 50 mcg as needed.  Lamictal 100 mg twice a day, Trileptal 300 mg twice a day, pantoprazole 40 mL a day, Trulance 3 mg a day, Phenergan 25 mg as needed.  Allergies to aspirin, Cipro, Keppra which cause itching, and IV iodine contrast which caused a rash.  She previously had a CT contrast rash reaction responded to Benadryl.  Weight when last seen at [facility] had been 101.2 kg with a BMI of 38.28. She says 213.  Labs then showed RBC count 3.73, neutrophils 4.9, creatinine 1.05 chloride 111 bilirubin 0.2.  Mri done 1.5 weeks ago.  Dec 2023: ct: FINDINGS: Normal heart size with trace pericardial effusion. Lung bases are clear. Indeterminant somewhat ill-defined hypodense subcapsular lesion in the right hepatic lobe measuring 2.8 x 2.5 cm (series 2 image 23). Gallbladder and biliary tree are normal. Spleen is unremarkable. Mild fatty atrophy of the pancreas without ductal dilation or inflammation. No adrenal nodule. Kidneys are symmetric in size without contour deforming lesion, hydronephrosis or urolithiasis.  Air-fluid level in the distal esophagus may suggest reflux. Stomach is mostly decompressed. No bowel obstruction. The appendix is not clearly identified (she does not have), system put reported clinical history of prior appendectomy. Mild colonic fecal loading. No free fluid or free air. Normal caliber abdominal aorta. No abdominal adenopathy. Urinary bladder is normal. Uterus is absent. No suspicious adnexal lesion, free pelvic fluid or enlarged pelvic adenopathy. Small trabeculated lucency in the posterior T9 vertebral body favors an osseous hemangioma. No acute fracture or aggressive osseous lesion. IMPRESSION:1. Indeterminant somewhat ill-defined hypodense subcapsular lesion in the right hepatic lobe measuring 2.8 x 2.5 cm. Benign and malignant etiologies are considered possible for this lesion, which should be completely characterized with a short interval nonemergent abdominal MRI with contrast. 2. No additional acute findings in the abdomen or pelvis to suggest etiology for flank pain. Specifically, no urolithiasis. Appendix is not seen, consistent with reported history of appendectomy.3. Mild colonic fecal loading. Air-fluid level in the distal esophagus may suggest reflux  Mri 2024: FINDINGS: Lower Thorax: Normal. Liver: No fat or iron. Segment 6 2.9 x 2.4 cm (5:21) T2 hyperintense T1 isointense lesion with progressive discontinuous nodular peripheral enhancement consistent with hemangioma.   Gallbladder/Biliary Tree: Normal. Spleen: Normal. Pancreas: Normal. Adrenal Glands: Normal.  Kidneys/Ureters: Normal. Gastrointestinal: Normal.  Lymph Nodes: Normal  Vessels: Normal. Peritoneum/Retroperitoneum: Normal.  Bones/Soft Tissues: Degenerative changes of spine without aggressive osseous lesion. IMPRESSION:Segment 6 hepatic hemangioma. No concerning hepatic lesions. The images were reviewed and interpreted by Carmen Booth MD.  CT scan in 2022 ER visit showed no acute malady in the abdomen or pelvis but there was a 2.1 cm hypodensity in the posterior right lobe which was felt unchanged versus 2020 while incomplete characterize could be hemangioma.  CT head at the time showed a subcentimeter hypodensity in the left medial temporal lobe that was seen in 2020 that appeared to be slightly decreased in size and may have been suspicious for an underlying cavernous transformation area.  No gray-white loss seen and no hydrocephalus.  Patient had EGD 2020 that showed no gross lesions and few diminutive sessile polyps with no stigmata of recent bleeding in the stomach with a polyp removed with cold forceps.  Grade B esophagitis was seen and 2 cm hiatal hernia.  Path report showed fundic gland polyp and no H. pylori.  She is doing egd in 2024.  Dec 27 2023L na 141 and k 3.8 amd cl 111 and co2 23 and bun 17 and cr 1,06 and glu 83 and alb 4.3 and alk 91 and ast 89 and alt 47 and 2023 alk 96 and ast 25 and alt 33.   They in the lamictal in between  and dec, She remains on the higher dose.  Lamictal livertox: Prospective studies suggest that less than 1% of subjects develop elevations in serum aminotransferase levels during long term lamotrigine therapy. However, clinically apparent hepatotoxicity from lamotrigine is well known and is estimated to occur in one in 2,000 to 10,000 treated patients. The liver injury is usually part of a systemic immuno-allergic reaction (anticonvulsant hypersensitivity syndrome [AHS] or drug rash with eosinophilia and systemic symptoms [DRESS] syndrome). The latency is typically short, ranging from one to several weeks. Presenting symptoms are a diffuse maculopapular rash, followed in a few days by high fever, nausea and vomiting. The rash can develop into a systemic hypersensitivity reaction and multiorgan failure or be associated with jaundice and hepatitis. Eosinophilia is common, and facial edema, lymphadenopathy and atypical lymphocytosis can occur. The pattern of liver enzyme elevations is usually hepatocellular and severity ranges from mild-to-moderate ALT elevations accompanying the generalized hypersensitivity reaction, to an icteric hepatitis, to a severe hepatitis and acute liver failure. Liver biopsy shows portal inflammation, hepatocellular necrosis and bile duct proliferation. In some instances of severe hypersensitivity syndrome with acute multiorgan failure, the hepatic involvement may represent ischemic injury.Lamotrigine has also been linked to rare instances of hemophagocytic lymphohistiocytosis, a rare and severe immune related reaction characterized by unremitting activation of CD8+ T cells and macrophages that causes multiorgan damage including liver injury, hepatitis and liver failure. Cases of HLH linked to lamotrigine arose within 1 to 4 weeks of starting the drug, usually in infants or children, marked clinically by fever, rash, cytopenias, hepatitis, high triglycerides and ferritin levels and bone marrow or liver histology demonstrating hemophagocytosisLikelihood score: A (well known cause of clinically apparent liver injury).  Xcopri she started it near end of 2023. Cenobamate: In controlled clinical trials, addition of cenobamate to standard anticonvulsant therapy was associated with transient, mild-to-moderate serum aminotransferase elevations in 1% to 4% of patients. In the preregistration trials of cenobamate, there were no cases of clinically apparent liver injury with jaundice. However, among 953 patients with exposure to cenobamate in these trials, there were three cases of DRESS syndrome accompanied by serum aminotransferase elevations, which arose during the first 3 to 6 weeks of treatment in subjects who had a relative rapid initial titration (4 to 6 weeks). In large open label studies using a more prolonged titration period (12 weeks), no instances of DRESS syndrome were reported among more than 1000 participants. Felbamate, a structurally related carbamate anticonvulsant, is a well-known cause of drug induced liver injury and has a boxed warning and restricted availability because of severe hypersensitivity reactions including acute liver failure and aplastic anemia. Thus, clinically significant liver injury from cenobamate may occur and can be severe, but is rare.Likelihood score: D (possible rare cause of clinically apparent liver injury in the context of generalized hypersensitivity syndrome).   Mtx: she started that and on it back last 8 months and that was newer and for rheumatism. Perlivertox Methotrexate is well known to cause serum aminotransferase elevations and long term therapy has been linked to development of fatty liver disease, fibrosis and even cirrhosis. The literature on methotrexate is extensive, but with great variability in rates of liver test and biopsy abnormalities at different doses, dose regimens and durations of therapy.With high dose intravenous methotrexate, serum ALT levels can rise to 10 to 20 times the upper limit of normal (ULN) within 12 to 48 hours, but levels then fall rapidly to normal with only rare instances of jaundice or symptoms of liver injury. With long term, low-to-moderate dose methotrexate therapy, elevations in serum ALT or AST values occur in 15% to 50% of patients, but are usually mild and self-limiting. Approximately 5% of patients have elevations greater than twice normal and these abnormalities resolve rapidly with discontinuation or dose modification, but can resolve even with continuation at the same dose level. The reported rate of ALT elevations during therapy has varied considerably, perhaps because of differences in frequency of determinations (every month vs every 3, 6 or 12 months) and due to the timing of the blood sampling (whether just before or soon after the once weekly dose). Finally, coadministration of folic acid has been shown to decrease the frequency of serum enzyme elevations and now is commonly used.Long term therapy with methotrexate has been associated with development of fatty liver and hepatic fibrosis and, in rare instances, portal hypertension and symptomatic cirrhosis. Symptoms are usually absent until cirrhosis is present, and liver tests are typically normal or minimally and transiently elevated. Routine monitoring of patients with regular liver biopsies done at 1 to 2 year intervals or with cumulative methotrexate doses of 1 to 10 grams demonstrates that approximately 30% of patients develop mild-to-moderate histological abnormalities (fat, cellular unrest, mild inflammation, nuclear atypical) and 2 to 20% of patients develop some degree of hepatic fibrosis. Well documented cases of cirrhosis arising during long term methotrexate therapy have been reported, but cirrhosis is rare in prospective series, even with routine histological monitoring. Patients who develop fibrosis on long term methotrexate therapy often have other risk factors for fatty liver disease, including excessive alcohol use, obesity, diabetes and concurrent administration of other potentially hepatotoxic agents. Use of high doses and daily methotrexate dosing is particularly associated with development of hepatic fibrosis and rates of cirrhosis of greater than 20% after 5 to 10 years of treatment. With more modern dose regimens (5 to 15 mg in one dose weekly with folate supplementation), fibrosis and clinically apparent liver disease are rare even with long term use. The hepatic fibrosis and cirrhosis due to methotrexate typically arise after 2 to 10 years of treatment and can present with ascites, variceal hemorrhage or hepatosplenomegaly. Some patients present with signs and symptoms of portal hypertension, yet have only moderate degrees of fibrosis, suggesting that methotrexate may also cause nodular regeneration. Patients who develop portal hypertension and cirrhosis usually have had minimal or no elevations in serum aminotransferase or alkaline phosphatase levels, and monitoring using serum enzymes appears to be poorly predictive of fibrosis development. Noninvasive markers of hepatic fibrosis, such as serial platelet counts, serum procollagen III aminoterminal peptide (PIIIP), serum bile acids, hepatic ultrasound, advanced imaging techniques and transient elastography may be more efficient in screening for fibrosis in patients on long term methotrexate, but the reliability and accuracy of these approaches has not been documented prospectively. Patients with cirrhosis due to methotrexate are often asymptomatic and the condition tends to be non-progressive, even in those who restart low dose therapy. Rare instances of hepatocellular carcinoma have been reported in patients with suspected methotrexate induced cirrhosis.Low dose, long term methotrexate therapy has also been implicated in rare instances of reactivation of hepatitis B in patients with rheumatoid arthritis or psoriasis who were HBsAg carriers, without HBeAg and with normal ALT levels and no detectable or low levels of HBV DNA before starting methotrexate. The frequency of reactivation with methotrexate is unknown, but is probably low. Reactivation typically presents after years of therapy with methotrexate and most published cases were also receiving corticosteroids. The clinical presentation is characterized by insidious onset of fatigue, nausea and jaundice accompanied by marked elevations in serum ALT and HBV DNA levels. In some instances, the acute injury is severe and progressive resulting in liver failure. In many case reports, reactivation occurred when methotrexate was withdrawn, perhaps as a result of restoration of immune reactivity in those in whom HBV DNA levels have risen during treatment. Reactivation has also been described in patients with antibodies to HBV without HBsAg (reverse seroconversion) treated with methotrexate and prednisone. The cases of reactivation of hepatitis B published in the literature have mostly resulted in death or emergency liver transplantation, perhaps reflecting publication bias for more severe cases. These cases have led to recommendations for routine screening for HBsAg before starting long term methotrexate therapy and prophylaxis with antiviral agents or careful monitoring for rises in HBV DNA levels if methotrexate is used. However, whether methotrexate on its own, without prednisone, can cause reactivation of hepatitis B is not clear.Likelihood score: A (well known cause of chronic, clinically significant liver injury, portal hypertension and cirrhosis).  She should let the rheumatologist know re the labs being up as the mtx and lamictal are the two most likely to do this.   [facility] 2020 labs showed WBC 12.6, hemoglobin 13.1 platelet count 281 MCV 96.2 neutrophils 8.6 elevated lymphocytes 3.0 glucose 93 BUN 25 creatinine 1.16 sodium 137 potassium 4.4 calcium 8.4 AST 18 ALT 18 alk phos 91 bilirubin 0.3  [facility] ultrasound 2020 showed liver echogenicity increased suggesting fatty infiltration in the well-circumscribed hypoechoic lesion in the right lobe measuring 1.9 x 2.6 x 2 cm and approximately stable versus 2019 and allowing for slight differences in technique.  No gallstones seen.  Common bile duct 2 mm.  Partially visualized pancreas unremarkable.  Right kidney 10.2 cm with no hydronephrosis.  They recommended further characterization with MRI if not previously performed.  CT stone protocol done in 2020 showing stable 2.8 cm hypodense lesion posterior right lobe of the liver likely benign.  Gallbladder biliary tree normal and spleen normal.  Appendix not well-seen however no right lower quadrant inflammatory changes seen.  Pancreas normal as were adrenal glands and kidneys.  No aggressive osseous lesion but likely stable hemangioma at the lumbar 1 vertebral body also seen.  No renal stones.  Plan 1. Plan to redo labs because liver labs up due to the meds that she is on. Meds highest risk mtx and lamictal as started  to take mtx more regularly and inc on the lamictal.  2. Need to show this info to rheum and neurologist for them to look at this.  3. Need to do the labs and I suspect the labs will be higher and if so the one or other or both needs to be changed as labs up. With mtx need to stop of labs go up and stay up.  labs normal and now up. 4. Hemangioma is benign and not likely to cause pain. 5. Check for hepatitis A/B status.  Check hep C as well.  Consider vaccine for hep A/B as needed. 6. See post.  Duration of the visit was 80 minutes with 40 minutes of chart prep and 40 minutes of face to face visit with time spent reviewing historical and recent records, discussing their current status relative to same and reviewing future plans for the patient.

## 2024-01-18 NOTE — EXAM-PHYSICAL EXAM
Gen: awake and responsive. Eyes: anicteric,normal lids. Mouth: normal lips. Nose: no drainage. Hearing: intact grossly. Neck: trachea midline and no jvd. CV: RRR no s3. Lungs: clear. No wheezes. Abd: Soft, nabs, some tenderness over liver edge. No appreciable hsm. Ext: no sig edema, and no sig palm erythema. Neuro: moves all 4 ext grossly. Noasterixis. Skin: no pruritis and no sig palm erythema.

## 2024-01-24 ENCOUNTER — TELEPHONE ENCOUNTER (OUTPATIENT)
Dept: URBAN - METROPOLITAN AREA CLINIC 86 | Facility: CLINIC | Age: 59
End: 2024-01-24

## 2024-01-24 LAB
A/G RATIO: 1.9
ABSOLUTE BASOPHILS: 20
ABSOLUTE EOSINOPHILS: 101
ABSOLUTE LYMPHOCYTES: 1702
ABSOLUTE MONOCYTES: 422
ABSOLUTE NEUTROPHILS: 4456
ACTIN (SMOOTH MUSCLE) ANTIBODY (IGG): <20
ALBUMIN: 4.3
ALKALINE PHOSPHATASE: 92
ALT (SGPT): 31
ANA SCREEN, IFA: POSITIVE
AST (SGOT): 18
BASOPHILS: 0.3
BILIRUBIN, TOTAL: 0.3
BUN/CREATININE RATIO: 17
BUN: 19
CALCIUM: 9.2
CARBON DIOXIDE, TOTAL: 24
CHLORIDE: 111
CREATININE: 1.12
EGFR: 57
EOSINOPHILS: 1.5
GLOBULIN, TOTAL: 2.3
GLUCOSE: 85
HEMATOCRIT: 38.5
HEMOGLOBIN: 12.7
HEPATITIS A AB, TOTAL: REACTIVE
HEPATITIS B CORE AB TOTAL: (no result)
HEPATITIS B SURFACE AB IMMUNITY, QN: <5
HEPATITIS B SURFACE ANTIGEN: (no result)
HEPATITIS C ANTIBODY: (no result)
IMMUNOGLOBULIN G, QN, SERUM: 997
IMMUNOGLOBULIN M: 78
LYMPHOCYTES: 25.4
MCH: 32
MCHC: 33
MCV: 97
MITOCHONDRIAL (M2) ANTIBODY: <=20
MONOCYTES: 6.3
MPV: 10.8
NEUTROPHILS: 66.5
PLATELET COUNT: 265
POTASSIUM: 4.3
PROTEIN, TOTAL: 6.6
RDW: 11.7
RED BLOOD CELL COUNT: 3.97
SODIUM: 142
WHITE BLOOD CELL COUNT: 6.7

## 2024-01-24 NOTE — HPI-TODAY'S VISIT:
Dear Lenora Farah,  January 18 labs show IgG normal at 997 and IgM normal at 78 which would suggest that the immune system is not at an abnormal level at this timeframe. You have no immunity seen to hepatitis B surface antibody and need to consider elective vaccination for same.   Smooth muscle antibody was negative as well as AMA. Sugar was normal at 85 BUN was normal at 19 with creatinine up to 1.12.  Please share with primary provider. Sodium 142 potassium 4.3 normal.  Chloride a little up at 111.  CO2 of 24 with a calcium 9.2 and albumin 4.3 bilirubin 0.3 alk phos 92 and an AST of 18. ALT was 31 which was a little up.  Ideal ALT is less than 25. Your SOFIA screen was positive but again as you saw immunoglobulins were not increased suggesting that while the SOFIA is positive it does not appear to be driving issues. WBC was 6.7 hemoglobin 12.7 plate count 265 MCV 97 with normal neutrophils and lymphocytes. Hep B core total negative indicating no prior exposure to hep B and hep B surface antigen negative indicating no active hep B. You are immune to hepatitis A however so that is good to know.  Also your hep C antibody was negative which is also good to note. For comparison, December 2023 labs showed AST of 89 and ALT of 47 so curiously labs are lower. Certainly curious would be if the methotrexate dose timing and the drop of the labs correlated that would be important to note because shortly after the methotrexate dosing you would expect the labs to be higher and just prior to the next methotrexate dosing you would expect the labs to be lower. Please share these findings with your primary providers. Dr Wilburn

## 2024-01-29 ENCOUNTER — LAB OUTSIDE AN ENCOUNTER (OUTPATIENT)
Dept: URBAN - METROPOLITAN AREA CLINIC 86 | Facility: CLINIC | Age: 59
End: 2024-01-29

## 2024-02-12 ENCOUNTER — LAB (OUTPATIENT)
Dept: URBAN - METROPOLITAN AREA CLINIC 86 | Facility: CLINIC | Age: 59
End: 2024-02-12

## 2024-02-22 ENCOUNTER — EGD (OUTPATIENT)
Dept: URBAN - METROPOLITAN AREA MEDICAL CENTER 33 | Facility: MEDICAL CENTER | Age: 59
End: 2024-02-22

## 2024-02-22 RX ORDER — LAMOTRIGINE 100 MG/1
1 TABLET TABLET, ORALLY DISINTEGRATING ORAL TWICE A DAY
Status: ACTIVE | COMMUNITY

## 2024-02-22 RX ORDER — METHOTREXATE 2.5 MG/1
4 TABLET ORAL
Status: ACTIVE | COMMUNITY

## 2024-02-22 RX ORDER — CENOBAMATE 50 MG/1
1 TABLET TABLET, FILM COATED ORAL ONCE A DAY
Status: ACTIVE | COMMUNITY

## 2024-02-22 RX ORDER — PROMETHAZINE HYDROCHLORIDE 25 MG/1
TABLET ORAL
Qty: 0 | Refills: 0 | Status: ACTIVE | COMMUNITY
Start: 1900-01-01

## 2024-02-22 RX ORDER — DICYCLOMINE HYDROCHLORIDE 10 MG/1
2 CAPSULES CAPSULE ORAL THREE TIMES A DAY
Status: ACTIVE | COMMUNITY

## 2024-02-22 RX ORDER — TOPIRAMATE 100 MG/1
1 IN AND 2 IN PM TABLET, FILM COATED ORAL TWICE A DAY
Status: ACTIVE | COMMUNITY

## 2024-02-22 RX ORDER — SUMATRIPTAN SUCCINATE 100 MG/1
1 TABLET AT LEAST 2 HOURS BETWEEN DOSES AS NEEDED TABLET, FILM COATED ORAL TWICE A DAY
Status: ACTIVE | COMMUNITY

## 2024-02-22 RX ORDER — TRAMADOL HYDROCHLORIDE 50 MG/1
1 TABLET AS NEEDED TABLET, FILM COATED ORAL
Status: ACTIVE | COMMUNITY

## 2024-02-22 RX ORDER — PLECANATIDE 3 MG/1
1 TABLET TABLET ORAL ONCE A DAY
Qty: 90 | Refills: 3 | Status: ACTIVE | COMMUNITY

## 2024-02-22 RX ORDER — ONDANSETRON HYDROCHLORIDE 4 MG/1
1 TABLET TABLET, FILM COATED ORAL
Qty: 30 | Refills: 0 | COMMUNITY
Start: 2023-10-25

## 2024-02-22 RX ORDER — ALBUTEROL SULFATE 90 UG/1
1 PUFF AS NEEDED AEROSOL, METERED RESPIRATORY (INHALATION)
Status: ACTIVE | COMMUNITY

## 2024-02-22 RX ORDER — FOLIC ACID 1 MG/1
1 TABLET TABLET ORAL ONCE A DAY
Status: ACTIVE | COMMUNITY

## 2024-02-22 RX ORDER — PANTOPRAZOLE SODIUM 40 MG/1
1 TABLET TABLET, DELAYED RELEASE ORAL ONCE A DAY
Status: ACTIVE | COMMUNITY

## 2024-02-22 RX ORDER — ATORVASTATIN CALCIUM 10 MG/1
1 TABLET TABLET, FILM COATED ORAL ONCE A DAY
Status: ACTIVE | COMMUNITY

## 2024-02-22 RX ORDER — FLUTICASONE PROPIONATE 50 UG/1
1 SPRAY IN EACH NOSTRIL SPRAY, METERED NASAL ONCE A DAY
Status: ACTIVE | COMMUNITY

## 2024-02-28 ENCOUNTER — OV EP (OUTPATIENT)
Dept: URBAN - METROPOLITAN AREA CLINIC 86 | Facility: CLINIC | Age: 59
End: 2024-02-28

## 2024-02-28 ENCOUNTER — OV EP (OUTPATIENT)
Dept: URBAN - METROPOLITAN AREA CLINIC 86 | Facility: CLINIC | Age: 59
End: 2024-02-28
Payer: COMMERCIAL

## 2024-02-28 VITALS
TEMPERATURE: 97.1 F | SYSTOLIC BLOOD PRESSURE: 109 MMHG | HEART RATE: 72 BPM | WEIGHT: 213 LBS | HEIGHT: 64 IN | DIASTOLIC BLOOD PRESSURE: 78 MMHG | BODY MASS INDEX: 36.37 KG/M2

## 2024-02-28 DIAGNOSIS — K58.9 IRRITABLE BOWEL SYNDROME (IBS): ICD-10-CM

## 2024-02-28 DIAGNOSIS — Z71.85 VACCINE COUNSELING: ICD-10-CM

## 2024-02-28 DIAGNOSIS — D18.03 LIVER HEMANGIOMA: ICD-10-CM

## 2024-02-28 DIAGNOSIS — K76.9 LIVER LESION: ICD-10-CM

## 2024-02-28 DIAGNOSIS — E66.9 OBESITY (BMI 30-39.9): ICD-10-CM

## 2024-02-28 DIAGNOSIS — G40.909 SEIZURE DISORDER: ICD-10-CM

## 2024-02-28 DIAGNOSIS — R10.13 DYSPEPSIA: ICD-10-CM

## 2024-02-28 DIAGNOSIS — Z79.899 HIGH RISK MEDICATION USE: ICD-10-CM

## 2024-02-28 DIAGNOSIS — R93.5 ABNORMAL ULTRASOUND OF ABDOMEN: ICD-10-CM

## 2024-02-28 DIAGNOSIS — K76.0 HEPATIC STEATOSIS: ICD-10-CM

## 2024-02-28 PROCEDURE — 99214 OFFICE O/P EST MOD 30 MIN: CPT | Performed by: PHYSICIAN ASSISTANT

## 2024-02-28 RX ORDER — ONDANSETRON HYDROCHLORIDE 4 MG/1
1 TABLET TABLET, FILM COATED ORAL
Qty: 30 | Refills: 0 | Status: ON HOLD | COMMUNITY
Start: 2023-10-25

## 2024-02-28 RX ORDER — TOPIRAMATE 100 MG/1
1 IN AND 2 IN PM TABLET, FILM COATED ORAL TWICE A DAY
Status: ACTIVE | COMMUNITY

## 2024-02-28 RX ORDER — PANTOPRAZOLE SODIUM 40 MG/1
1 TABLET TABLET, DELAYED RELEASE ORAL ONCE A DAY
Status: ON HOLD | COMMUNITY

## 2024-02-28 RX ORDER — ALBUTEROL SULFATE 90 UG/1
1 PUFF AS NEEDED AEROSOL, METERED RESPIRATORY (INHALATION)
Status: ON HOLD | COMMUNITY

## 2024-02-28 RX ORDER — PROMETHAZINE HYDROCHLORIDE 25 MG/1
TABLET ORAL
Qty: 0 | Refills: 0 | Status: ACTIVE | COMMUNITY
Start: 1900-01-01

## 2024-02-28 RX ORDER — TRAMADOL HYDROCHLORIDE 50 MG/1
1 TABLET AS NEEDED TABLET, FILM COATED ORAL
Status: ON HOLD | COMMUNITY

## 2024-02-28 RX ORDER — CENOBAMATE 50 MG/1
1 TABLET TABLET, FILM COATED ORAL ONCE A DAY
Status: ACTIVE | COMMUNITY

## 2024-02-28 RX ORDER — FLUTICASONE PROPIONATE 50 UG/1
1 SPRAY IN EACH NOSTRIL SPRAY, METERED NASAL ONCE A DAY
Status: ACTIVE | COMMUNITY

## 2024-02-28 RX ORDER — METHOTREXATE 2.5 MG/1
4 TABLET ORAL
Status: ON HOLD | COMMUNITY

## 2024-02-28 RX ORDER — LAMOTRIGINE 100 MG/1
1 TABLET TABLET, ORALLY DISINTEGRATING ORAL TWICE A DAY
Status: ACTIVE | COMMUNITY

## 2024-02-28 RX ORDER — FOLIC ACID 1 MG/1
1 TABLET TABLET ORAL ONCE A DAY
Status: ACTIVE | COMMUNITY

## 2024-02-28 RX ORDER — DICYCLOMINE HYDROCHLORIDE 10 MG/1
2 CAPSULES CAPSULE ORAL THREE TIMES A DAY
Status: ACTIVE | COMMUNITY

## 2024-02-28 RX ORDER — ATORVASTATIN CALCIUM 10 MG/1
1 TABLET TABLET, FILM COATED ORAL ONCE A DAY
Status: ACTIVE | COMMUNITY

## 2024-02-28 RX ORDER — SUMATRIPTAN SUCCINATE 100 MG/1
1 TABLET AT LEAST 2 HOURS BETWEEN DOSES AS NEEDED TABLET, FILM COATED ORAL TWICE A DAY
Status: ACTIVE | COMMUNITY

## 2024-02-28 RX ORDER — PLECANATIDE 3 MG/1
1 TABLET TABLET ORAL ONCE A DAY
Qty: 90 | Refills: 3 | Status: ACTIVE | COMMUNITY

## 2024-02-28 NOTE — PHYSICAL EXAM GASTROINTESTINAL
Abdomen , soft, nontender, nondistended , no guarding or rigidity , no masses palpable , normal bowel sounds , Liver and Spleen , no hepatomegaly present , no hepatosplenomegaly , liver nontender , spleen not palpable  normal...

## 2024-02-28 NOTE — HPI-TODAY'S VISIT:
Pt is listed as being a 58-year-old female who was previously seen Dr. Fernandez in our group and seen 2024 sees DR Fleming for primary care and  now comes in to be seen for liver lesions.  A copy of the note will be sent to the referring provider.  24 ov   labs show IgG normal at 997 and IgM normal at 78 which would suggest that the immune system is not at an abnormal level at this timeframe. You have no immunity seen to hepatitis B surface antibody and need to consider elective vaccination for same.   Smooth muscle antibody was negative as well as AMA. Sugar was normal at 85 BUN was normal at 19 with creatinine up to 1.12. Please share with primary provider. Sodium 142 potassium 4.3 normal. Chloride a little up at 111. CO2 of 24 with a calcium 9.2 and albumin 4.3 bilirubin 0.3 alk phos 92 and an AST of 18. ALT was 31 which was a little up. Ideal ALT is less than 25. Your SOFIA screen was positive but again as you saw immunoglobulins were not increased suggesting that while the SOFIA is positive it does not appear to be driving issues. WBC was 6.7 hemoglobin 12.7 plate count 265 MCV 97 with normal neutrophils and lymphocytes. Hep B core total negative indicating no prior exposure to hep B and hep B surface antigen negative indicating no active hep B. You are immune to hepatitis A however so that is good to know. Also your hep C antibody was negative which is also good to note. For comparison, 2023 labs showed AST of 89 and ALT of 47 so curiously labs are lower. Certainly curious would be if the methotrexate dose timing and the drop of the labs correlated that would be important to note because shortly after the methotrexate dosing you would expect the labs to be higher and just prior to the next methotrexate dosing you would expect the labs to be lower. Please share these findings with your primary providers.   She stopped the mtx after the last visit. Has not started on new med. has follow up with them next month  had egd she said pain better off the mtx.  discussed the above and would be important to note if there was a change based on mtx timing but off now.  discussed ast/alt goal less than 25 discussed need to see where labs are now and see what rheumatology decides on the meds   aniket  Has epiliepsy and she sees [facility] and she Nov/Dec  2023. That raised the concern re the liver.  Notes reviewed brenda and prince:   Patient listed as having a past medical history of appendectomy, hysterectomy, duodenitis, hepatic hemangioma, and IBS.  In the past has been treated with dicyclomine and Phenergan by her primary GI.  Patient also mentions having seizure disorder and cavernous malformation history. Followed by [facility] for that and last seizure recent and Tuesday of this week. She has them often and mainly at night.  Lumbar radiculopathy and cervical disc disease as well as hiatal hernia.  Other history included carpal tunnel, endometriosis, family history of breast cancer, fibrocystic breast disease, GERD, migraine, plantar fasciitis.  Other surgeries include brain surgery, , left knee surgery, colonoscopy and upper endoscopy.  Endometrial ablation, hysterectomy, shoulder surgery, total abdominal hysterectomy and bilateral salpingo-oophorectomy. Foot surgery.  Family history of sister with breast cancer and leukemia and she passed. Mother with history of heart disease cervical cancer hypertension arthritis.  Social history not a smoker not a drinker.  Medicines listed included albuterol 90 mcg inhaler as needed, dicyclomine 10 mg as needed, Flonase 50 mcg as needed.  Lamictal 100 mg twice a day, Trileptal 300 mg twice a day, pantoprazole 40 mL a day, Trulance 3 mg a day, Phenergan 25 mg as needed.  Allergies to aspirin, Cipro, Keppra which cause itching, and IV iodine contrast which caused a rash.  She previously had a CT contrast rash reaction responded to Benadryl.  Weight when last seen at [facility] had been 101.2 kg with a BMI of 38.28. She says 213.  Labs then showed RBC count 3.73, neutrophils 4.9, creatinine 1.05 chloride 111 bilirubin 0.2.  Mri done 1.5 weeks ago.  Dec 2023: ct: FINDINGS: Normal heart size with trace pericardial effusion. Lung bases are clear. Indeterminant somewhat ill-defined hypodense subcapsular lesion in the right hepatic lobe measuring 2.8 x 2.5 cm (series 2 image 23). Gallbladder and biliary tree are normal. Spleen is unremarkable. Mild fatty atrophy of the pancreas without ductal dilation or inflammation. No adrenal nodule. Kidneys are symmetric in size without contour deforming lesion, hydronephrosis or urolithiasis.  Air-fluid level in the distal esophagus may suggest reflux. Stomach is mostly decompressed. No bowel obstruction. The appendix is not clearly identified (she does not have), system put reported clinical history of prior appendectomy. Mild colonic fecal loading. No free fluid or free air. Normal caliber abdominal aorta. No abdominal adenopathy. Urinary bladder is normal. Uterus is absent. No suspicious adnexal lesion, free pelvic fluid or enlarged pelvic adenopathy. Small trabeculated lucency in the posterior T9 vertebral body favors an osseous hemangioma. No acute fracture or aggressive osseous lesion. IMPRESSION:1. Indeterminant somewhat ill-defined hypodense subcapsular lesion in the right hepatic lobe measuring 2.8 x 2.5 cm. Benign and malignant etiologies are considered possible for this lesion, which should be completely characterized with a short interval nonemergent abdominal MRI with contrast. 2. No additional acute findings in the abdomen or pelvis to suggest etiology for flank pain. Specifically, no urolithiasis. Appendix is not seen, consistent with reported history of appendectomy.3. Mild colonic fecal loading. Air-fluid level in the distal esophagus may suggest reflux  Mri 2024: FINDINGS: Lower Thorax: Normal. Liver: No fat or iron. Segment 6 2.9 x 2.4 cm (5:21) T2 hyperintense T1 isointense lesion with progressive discontinuous nodular peripheral enhancement consistent with hemangioma.   Gallbladder/Biliary Tree: Normal. Spleen: Normal. Pancreas: Normal. Adrenal Glands: Normal.  Kidneys/Ureters: Normal. Gastrointestinal: Normal.  Lymph Nodes: Normal  Vessels: Normal. Peritoneum/Retroperitoneum: Normal.  Bones/Soft Tissues: Degenerative changes of spine without aggressive osseous lesion. IMPRESSION:Segment 6 hepatic hemangioma. No concerning hepatic lesions. The images were reviewed and interpreted by Carmen Booth MD.  CT scan in 2022 ER visit showed no acute malady in the abdomen or pelvis but there was a 2.1 cm hypodensity in the posterior right lobe which was felt unchanged versus 2020 while incomplete characterize could be hemangioma.  CT head at the time showed a subcentimeter hypodensity in the left medial temporal lobe that was seen in 2020 that appeared to be slightly decreased in size and may have been suspicious for an underlying cavernous transformation area.  No gray-white loss seen and no hydrocephalus.  Patient had EGD 2020 that showed no gross lesions and few diminutive sessile polyps with no stigmata of recent bleeding in the stomach with a polyp removed with cold forceps.  Grade B esophagitis was seen and 2 cm hiatal hernia.  Path report showed fundic gland polyp and no H. pylori.  She is doing egd in 2024.  Dec 27 2023L na 141 and k 3.8 amd cl 111 and co2 23 and bun 17 and cr 1,06 and glu 83 and alb 4.3 and alk 91 and ast 89 and alt 47 and 2023 alk 96 and ast 25 and alt 33.   They in the lamictal in between  and dec, She remains on the higher dose.  Lamictal livertox: Prospective studies suggest that less than 1% of subjects develop elevations in serum aminotransferase levels during long term lamotrigine therapy. However, clinically apparent hepatotoxicity from lamotrigine is well known and is estimated to occur in one in 2,000 to 10,000 treated patients. The liver injury is usually part of a systemic immuno-allergic reaction (anticonvulsant hypersensitivity syndrome [AHS] or drug rash with eosinophilia and systemic symptoms [DRESS] syndrome). The latency is typically short, ranging from one to several weeks. Presenting symptoms are a diffuse maculopapular rash, followed in a few days by high fever, nausea and vomiting. The rash can develop into a systemic hypersensitivity reaction and multiorgan failure or be associated with jaundice and hepatitis. Eosinophilia is common, and facial edema, lymphadenopathy and atypical lymphocytosis can occur. The pattern of liver enzyme elevations is usually hepatocellular and severity ranges from mild-to-moderate ALT elevations accompanying the generalized hypersensitivity reaction, to an icteric hepatitis, to a severe hepatitis and acute liver failure. Liver biopsy shows portal inflammation, hepatocellular necrosis and bile duct proliferation. In some instances of severe hypersensitivity syndrome with acute multiorgan failure, the hepatic involvement may represent ischemic injury.Lamotrigine has also been linked to rare instances of hemophagocytic lymphohistiocytosis, a rare and severe immune related reaction characterized by unremitting activation of CD8+ T cells and macrophages that causes multiorgan damage including liver injury, hepatitis and liver failure. Cases of HLH linked to lamotrigine arose within 1 to 4 weeks of starting the drug, usually in infants or children, marked clinically by fever, rash, cytopenias, hepatitis, high triglycerides and ferritin levels and bone marrow or liver histology demonstrating hemophagocytosisLikelihood score: A (well known cause of clinically apparent liver injury).  Xcopri she started it near end of 2023. Cenobamate: In controlled clinical trials, addition of cenobamate to standard anticonvulsant therapy was associated with transient, mild-to-moderate serum aminotransferase elevations in 1% to 4% of patients. In the preregistration trials of cenobamate, there were no cases of clinically apparent liver injury with jaundice. However, among 953 patients with exposure to cenobamate in these trials, there were three cases of DRESS syndrome accompanied by serum aminotransferase elevations, which arose during the first 3 to 6 weeks of treatment in subjects who had a relative rapid initial titration (4 to 6 weeks). In large open label studies using a more prolonged titration period (12 weeks), no instances of DRESS syndrome were reported among more than 1000 participants. Felbamate, a structurally related carbamate anticonvulsant, is a well-known cause of drug induced liver injury and has a boxed warning and restricted availability because of severe hypersensitivity reactions including acute liver failure and aplastic anemia. Thus, clinically significant liver injury from cenobamate may occur and can be severe, but is rare.Likelihood score: D (possible rare cause of clinically apparent liver injury in the context of generalized hypersensitivity syndrome).   Mtx: she started that and on it back last 8 months and that was newer and for rheumatism. Perlivertox Methotrexate is well known to cause serum aminotransferase elevations and long term therapy has been linked to development of fatty liver disease, fibrosis and even cirrhosis. The literature on methotrexate is extensive, but with great variability in rates of liver test and biopsy abnormalities at different doses, dose regimens and durations of therapy.With high dose intravenous methotrexate, serum ALT levels can rise to 10 to 20 times the upper limit of normal (ULN) within 12 to 48 hours, but levels then fall rapidly to normal with only rare instances of jaundice or symptoms of liver injury. With long term, low-to-moderate dose methotrexate therapy, elevations in serum ALT or AST values occur in 15% to 50% of patients, but are usually mild and self-limiting. Approximately 5% of patients have elevations greater than twice normal and these abnormalities resolve rapidly with discontinuation or dose modification, but can resolve even with continuation at the same dose level. The reported rate of ALT elevations during therapy has varied considerably, perhaps because of differences in frequency of determinations (every month vs every 3, 6 or 12 months) and due to the timing of the blood sampling (whether just before or soon after the once weekly dose). Finally, coadministration of folic acid has been shown to decrease the frequency of serum enzyme elevations and now is commonly used.Long term therapy with methotrexate has been associated with development of fatty liver and hepatic fibrosis and, in rare instances, portal hypertension and symptomatic cirrhosis. Symptoms are usually absent until cirrhosis is present, and liver tests are typically normal or minimally and transiently elevated. Routine monitoring of patients with regular liver biopsies done at 1 to 2 year intervals or with cumulative methotrexate doses of 1 to 10 grams demonstrates that approximately 30% of patients develop mild-to-moderate histological abnormalities (fat, cellular unrest, mild inflammation, nuclear atypical) and 2 to 20% of patients develop some degree of hepatic fibrosis. Well documented cases of cirrhosis arising during long term methotrexate therapy have been reported, but cirrhosis is rare in prospective series, even with routine histological monitoring. Patients who develop fibrosis on long term methotrexate therapy often have other risk factors for fatty liver disease, including excessive alcohol use, obesity, diabetes and concurrent administration of other potentially hepatotoxic agents. Use of high doses and daily methotrexate dosing is particularly associated with development of hepatic fibrosis and rates of cirrhosis of greater than 20% after 5 to 10 years of treatment. With more modern dose regimens (5 to 15 mg in one dose weekly with folate supplementation), fibrosis and clinically apparent liver disease are rare even with long term use. The hepatic fibrosis and cirrhosis due to methotrexate typically arise after 2 to 10 years of treatment and can present with ascites, variceal hemorrhage or hepatosplenomegaly. Some patients present with signs and symptoms of portal hypertension, yet have only moderate degrees of fibrosis, suggesting that methotrexate may also cause nodular regeneration. Patients who develop portal hypertension and cirrhosis usually have had minimal or no elevations in serum aminotransferase or alkaline phosphatase levels, and monitoring using serum enzymes appears to be poorly predictive of fibrosis development. Noninvasive markers of hepatic fibrosis, such as serial platelet counts, serum procollagen III aminoterminal peptide (PIIIP), serum bile acids, hepatic ultrasound, advanced imaging techniques and transient elastography may be more efficient in screening for fibrosis in patients on long term methotrexate, but the reliability and accuracy of these approaches has not been documented prospectively. Patients with cirrhosis due to methotrexate are often asymptomatic and the condition tends to be non-progressive, even in those who restart low dose therapy. Rare instances of hepatocellular carcinoma have been reported in patients with suspected methotrexate induced cirrhosis.Low dose, long term methotrexate therapy has also been implicated in rare instances of reactivation of hepatitis B in patients with rheumatoid arthritis or psoriasis who were HBsAg carriers, without HBeAg and with normal ALT levels and no detectable or low levels of HBV DNA before starting methotrexate. The frequency of reactivation with methotrexate is unknown, but is probably low. Reactivation typically presents after years of therapy with methotrexate and most published cases were also receiving corticosteroids. The clinical presentation is characterized by insidious onset of fatigue, nausea and jaundice accompanied by marked elevations in serum ALT and HBV DNA levels. In some instances, the acute injury is severe and progressive resulting in liver failure. In many case reports, reactivation occurred when methotrexate was withdrawn, perhaps as a result of restoration of immune reactivity in those in whom HBV DNA levels have risen during treatment. Reactivation has also been described in patients with antibodies to HBV without HBsAg (reverse seroconversion) treated with methotrexate and prednisone. The cases of reactivation of hepatitis B published in the literature have mostly resulted in death or emergency liver transplantation, perhaps reflecting publication bias for more severe cases. These cases have led to recommendations for routine screening for HBsAg before starting long term methotrexate therapy and prophylaxis with antiviral agents or careful monitoring for rises in HBV DNA levels if methotrexate is used. However, whether methotrexate on its own, without prednisone, can cause reactivation of hepatitis B is not clear.Likelihood score: A (well known cause of chronic, clinically significant liver injury, portal hypertension and cirrhosis).  She should let the rheumatologist know re the labs being up as the mtx and lamictal are the two most likely to do this.   [facility] 2020 labs showed WBC 12.6, hemoglobin 13.1 platelet count 281 MCV 96.2 neutrophils 8.6 elevated lymphocytes 3.0 glucose 93 BUN 25 creatinine 1.16 sodium 137 potassium 4.4 calcium 8.4 AST 18 ALT 18 alk phos 91 bilirubin 0.3  [facility] ultrasound 2020 showed liver echogenicity increased suggesting fatty infiltration in the well-circumscribed hypoechoic lesion in the right lobe measuring 1.9 x 2.6 x 2 cm and approximately stable versus 2019 and allowing for slight differences in technique.  No gallstones seen.  Common bile duct 2 mm.  Partially visualized pancreas unremarkable.  Right kidney 10.2 cm with no hydronephrosis.  They recommended further characterization with MRI if not previously performed.  CT stone protocol done in 2020 showing stable 2.8 cm hypodense lesion posterior right lobe of the liver likely benign.  Gallbladder biliary tree normal and spleen normal.  Appendix not well-seen however no right lower quadrant inflammatory changes seen.  Pancreas normal as were adrenal glands and kidneys.  No aggressive osseous lesion but likely stable hemangioma at the lumbar 1 vertebral body also seen.  No renal stones.

## 2024-03-27 ENCOUNTER — LAB (OUTPATIENT)
Dept: URBAN - METROPOLITAN AREA CLINIC 86 | Facility: CLINIC | Age: 59
End: 2024-03-27

## 2024-03-28 LAB
A/G RATIO: 1.8
ABSOLUTE BASOPHILS: 20
ABSOLUTE EOSINOPHILS: 91
ABSOLUTE LYMPHOCYTES: 1528
ABSOLUTE MONOCYTES: 481
ABSOLUTE NEUTROPHILS: 4381
ALBUMIN: 4.1
ALKALINE PHOSPHATASE: 92
ALT (SGPT): 23
AST (SGOT): 16
BASOPHILS: 0.3
BILIRUBIN, TOTAL: 0.3
BUN/CREATININE RATIO: 21
BUN: 23
CALCIUM: 8.9
CARBON DIOXIDE, TOTAL: 24
CHLORIDE: 112
CREATININE: 1.08
EGFR: 60
EOSINOPHILS: 1.4
GLOBULIN, TOTAL: 2.3
GLUCOSE: 72
HEMATOCRIT: 38.8
HEMOGLOBIN: 12.6
LYMPHOCYTES: 23.5
MCH: 32
MCHC: 32.5
MCV: 98.5
MONOCYTES: 7.4
MPV: 11.1
NEUTROPHILS: 67.4
PLATELET COUNT: 249
POTASSIUM: 4.1
PROTEIN, TOTAL: 6.4
RDW: 11.6
RED BLOOD CELL COUNT: 3.94
SODIUM: 143
WHITE BLOOD CELL COUNT: 6.5

## 2024-04-02 ENCOUNTER — OV EP (OUTPATIENT)
Dept: URBAN - METROPOLITAN AREA CLINIC 86 | Facility: CLINIC | Age: 59
End: 2024-04-02
Payer: COMMERCIAL

## 2024-04-02 VITALS
TEMPERATURE: 97.4 F | HEART RATE: 76 BPM | HEIGHT: 64 IN | DIASTOLIC BLOOD PRESSURE: 70 MMHG | WEIGHT: 213 LBS | SYSTOLIC BLOOD PRESSURE: 110 MMHG | BODY MASS INDEX: 36.37 KG/M2

## 2024-04-02 DIAGNOSIS — K76.9 LIVER LESION: ICD-10-CM

## 2024-04-02 DIAGNOSIS — D18.03 LIVER HEMANGIOMA: ICD-10-CM

## 2024-04-02 DIAGNOSIS — K58.8 OTHER IRRITABLE BOWEL SYNDROME: ICD-10-CM

## 2024-04-02 DIAGNOSIS — K76.0 HEPATIC STEATOSIS: ICD-10-CM

## 2024-04-02 PROCEDURE — 99215 OFFICE O/P EST HI 40 MIN: CPT

## 2024-04-02 RX ORDER — FOLIC ACID 1 MG/1
1 TABLET TABLET ORAL ONCE A DAY
Status: ACTIVE | COMMUNITY

## 2024-04-02 RX ORDER — PLECANATIDE 3 MG/1
1 TABLET TABLET ORAL ONCE A DAY
Qty: 90 | Refills: 3 | Status: ACTIVE | COMMUNITY

## 2024-04-02 RX ORDER — PANTOPRAZOLE SODIUM 40 MG/1
1 TABLET TABLET, DELAYED RELEASE ORAL ONCE A DAY
Status: DISCONTINUED | COMMUNITY

## 2024-04-02 RX ORDER — TRAMADOL HYDROCHLORIDE 50 MG/1
1 TABLET AS NEEDED TABLET, FILM COATED ORAL
Status: DISCONTINUED | COMMUNITY

## 2024-04-02 RX ORDER — CENOBAMATE 50 MG/1
1 TABLET TABLET, FILM COATED ORAL ONCE A DAY
Status: ACTIVE | COMMUNITY

## 2024-04-02 RX ORDER — TOPIRAMATE 100 MG/1
1 IN AND 2 IN PM TABLET, FILM COATED ORAL TWICE A DAY
Status: ACTIVE | COMMUNITY

## 2024-04-02 RX ORDER — METHOTREXATE 2.5 MG/1
4 TABLET ORAL
Status: DISCONTINUED | COMMUNITY

## 2024-04-02 RX ORDER — SUMATRIPTAN SUCCINATE 100 MG/1
1 TABLET AT LEAST 2 HOURS BETWEEN DOSES AS NEEDED TABLET, FILM COATED ORAL TWICE A DAY
Status: ACTIVE | COMMUNITY

## 2024-04-02 RX ORDER — PROMETHAZINE HYDROCHLORIDE 25 MG/1
TABLET ORAL
Qty: 0 | Refills: 0 | Status: ACTIVE | COMMUNITY
Start: 1900-01-01

## 2024-04-02 RX ORDER — ALBUTEROL SULFATE 90 UG/1
1 PUFF AS NEEDED AEROSOL, METERED RESPIRATORY (INHALATION)
Status: DISCONTINUED | COMMUNITY

## 2024-04-02 RX ORDER — DICYCLOMINE HYDROCHLORIDE 10 MG/1
2 CAPSULES CAPSULE ORAL THREE TIMES A DAY
Status: ACTIVE | COMMUNITY

## 2024-04-02 RX ORDER — ATORVASTATIN CALCIUM 10 MG/1
1 TABLET TABLET, FILM COATED ORAL ONCE A DAY
Status: ACTIVE | COMMUNITY

## 2024-04-02 RX ORDER — FLUTICASONE PROPIONATE 50 UG/1
1 SPRAY IN EACH NOSTRIL SPRAY, METERED NASAL ONCE A DAY
Status: ACTIVE | COMMUNITY

## 2024-04-02 RX ORDER — ONDANSETRON HYDROCHLORIDE 4 MG/1
1 TABLET TABLET, FILM COATED ORAL
Qty: 30 | Refills: 0 | Status: DISCONTINUED | COMMUNITY
Start: 2023-10-25

## 2024-04-02 RX ORDER — OMEPRAZOLE 40 MG/1
1 CAPSULE 30 MINUTES BEFORE MORNING MEAL CAPSULE, DELAYED RELEASE ORAL ONCE A DAY
Qty: 90 | Refills: 0 | Status: ACTIVE | COMMUNITY
Start: 2024-02-22

## 2024-04-02 RX ORDER — LAMOTRIGINE 100 MG/1
1 TABLET TABLET, ORALLY DISINTEGRATING ORAL TWICE A DAY
Status: ACTIVE | COMMUNITY

## 2024-04-02 NOTE — EXAM-PHYSICAL EXAM
Gen: awake and responsive.  Eyes: anicteric,normal lids.  Mouth: normal lips.  Nose: no drainage.  Hearing: intact grossly.  Neck: trachea midline and no jvd.  CV: RRR no s3.  Lungs: clear. No wheezes.  Abd: Soft, nabs, nr,NT. No appreciable hsm.  Ext: no sig edema, and some palm erythema.  Neuro: moves all 4 ext grossly. No asterixis.  Skin: no pruritis.

## 2024-04-02 NOTE — HPI-TODAY'S VISIT:
Pt is listed as being a 58-year-old female who was previously seen Dr. Fernandez in our group and seen  2024 by Ms Kwong and 2024 saw us and  sees DR Fleming for primary care and  came in to be seen for liver lesions.  A copy of the note will be sent to the referring provider.  She has  not had another scan noted since dec.  She did egd and was neg for celiac and trying to see.   labs showed WBC 6.4 hemoglobin 12.1 platelet count 265 MCV 98.7 sodium 144 potassium 4.0 chloride 114 CO2 22 BUN of 22 creatinine 1.05 glucose 58 bilirubin 0.3 alk phos 102 AST 27 ALT 56.   celiac panel noted to be negative.   labs show glucose 72 BUN 23 creatinine 1.08 elevated sodium 143 potassium 4.1 chloride 112 CO2 24 albumin 4.1 bilirubin 0.3 alk phos 92 AST of 16 ALT of 23 WBC 6.5 hemoglobin 12.6 plate count 249.  2/ ov Genna   labs show IgG normal at 997 and IgM normal at 78 which would suggest that the immune system is not at an abnormal level at this timeframe. You have no immunity seen to hepatitis B surface antibody and need to consider elective vaccination for same.   Smooth muscle antibody was negative as well as AMA. Sugar was normal at 85 BUN was normal at 19 with creatinine up to 1.12. Please share with primary provider. Sodium 142 potassium 4.3 normal. Chloride a little up at 111. CO2 of 24 with a calcium 9.2 and albumin 4.3 bilirubin 0.3 alk phos 92 and an AST of 18. ALT was 31 which was a little up. Ideal ALT is less than 25. Your SOFIA screen was positive but again as you saw immunoglobulins were not increased suggesting that while the SOFIA is positive it does not appear to be driving issues. WBC was 6.7 hemoglobin 12.7 plate count 265 MCV 97 with normal neutrophils and lymphocytes. Hep B core total negative indicating no prior exposure to hep B and hep B surface antigen negative indicating no active hep B. You are immune to hepatitis A however so that is good to know. Also your hep C antibody was negative which is also good to note.  She says that was on an arthritis med and not on it now.  Sees Rheum soon.  For comparison, 2023 labs showed AST of 89 and ALT of 47 so curiously labs are lower.  Certainly curious would be if the methotrexate dose timing and the drop of the labs correlated that would be important to note because shortly after the methotrexate dosing you would expect the labs to be higher and just prior to the next methotrexate dosing you would expect the labs to be lower. Please share these findings with your primary providers.  She will look into other than MTX options for this.  aniket  Has epiliepsy and she sees [facility] and she Nov/Dec  2023. That raised the concern re the liver.  Notes reviewed brenda and prince:   Patient listed as having a past medical history of appendectomy, hysterectomy, duodenitis, hepatic hemangioma, and IBS.  In the past has been treated with dicyclomine and Phenergan by her primary GI.  Patient also mentions having seizure disorder and cavernous malformation history. Followed by [facility] for that and last seizure recent and Tuesday of this week. She has them often and mainly at night.  Lumbar radiculopathy and cervical disc disease as well as hiatal hernia.  Other history included carpal tunnel, endometriosis, family history of breast cancer, fibrocystic breast disease, GERD, migraine, plantar fasciitis.  Other surgeries include brain surgery, , left knee surgery, colonoscopy and upper endoscopy.  Endometrial ablation, hysterectomy, shoulder surgery, total abdominal hysterectomy and bilateral salpingo-oophorectomy. Foot surgery.  Family history of sister with breast cancer and leukemia and she passed. Mother with history of heart disease cervical cancer hypertension arthritis.  Social history not a smoker not a drinker.  Medicines listed included albuterol 90 mcg inhaler as needed, dicyclomine 10 mg as needed, Flonase 50 mcg as needed.  Lamictal 100 mg twice a day, Trileptal 300 mg twice a day, pantoprazole 40 mL a day, Trulance 3 mg a day, Phenergan 25 mg as needed.  Allergies to aspirin, Cipro, Keppra which cause itching, and IV iodine contrast which caused a rash.  She previously had a CT contrast rash reaction responded to Benadryl.  Weight when last seen at [place] had been 101.2 kg with a BMI of 38.28. She says 213.  Labs then showed RBC count 3.73, neutrophils 4.9, creatinine 1.05 chloride 111 bilirubin 0.2.  Mri done 1.5 weeks ago.  Dec 2023: ct: FINDINGS: Normal heart size with trace pericardial effusion. Lung bases are clear. Indeterminant somewhat ill-defined hypodense subcapsular lesion in the right hepatic lobe measuring 2.8 x 2.5 cm (series 2 image 23). Gallbladder and biliary tree are normal. Spleen is unremarkable. Mild fatty atrophy of the pancreas without ductal dilation or inflammation. No adrenal nodule. Kidneys are symmetric in size without contour deforming lesion, hydronephrosis or urolithiasis.  Air-fluid level in the distal esophagus may suggest reflux. Stomach is mostly decompressed. No bowel obstruction. The appendix is not clearly identified (she does not have), system put reported clinical history of prior appendectomy. Mild colonic fecal loading. No free fluid or free air. Normal caliber abdominal aorta. No abdominal adenopathy. Urinary bladder is normal. Uterus is absent. No suspicious adnexal lesion, free pelvic fluid or enlarged pelvic adenopathy. Small trabeculated lucency in the posterior T9 vertebral body favors an osseous hemangioma. No acute fracture or aggressive osseous lesion. IMPRESSION:1. Indeterminant somewhat ill-defined hypodense subcapsular lesion in the right hepatic lobe measuring 2.8 x 2.5 cm. Benign and malignant etiologies are considered possible for this lesion, which should be completely characterized with a short interval nonemergent abdominal MRI with contrast. 2. No additional acute findings in the abdomen or pelvis to suggest etiology for flank pain. Specifically, no urolithiasis. Appendix is not seen, consistent with reported history of appendectomy.3. Mild colonic fecal loading. Air-fluid level in the distal esophagus may suggest reflux  Mri 2024: FINDINGS: Lower Thorax: Normal. Liver: No fat or iron. Segment 6 2.9 x 2.4 cm (5:21) T2 hyperintense T1 isointense lesion with progressive discontinuous nodular peripheral enhancement consistent with hemangioma.   Gallbladder/Biliary Tree: Normal. Spleen: Normal. Pancreas: Normal. Adrenal Glands: Normal.  Kidneys/Ureters: Normal. Gastrointestinal: Normal.  Lymph Nodes: Normal  Vessels: Normal. Peritoneum/Retroperitoneum: Normal.  Bones/Soft Tissues: Degenerative changes of spine without aggressive osseous lesion. IMPRESSION:Segment 6 hepatic hemangioma. No concerning hepatic lesions. The images were reviewed and interpreted by Carmen Booth MD.  CT scan in 2022 ER visit showed no acute malady in the abdomen or pelvis but there was a 2.1 cm hypodensity in the posterior right lobe which was felt unchanged versus 2020 while incomplete characterize could be hemangioma.  CT head at the time showed a subcentimeter hypodensity in the left medial temporal lobe that was seen in 2020 that appeared to be slightly decreased in size and may have been suspicious for an underlying cavernous transformation area.  No gray-white loss seen and no hydrocephalus.  Patient had EGD 2020 that showed no gross lesions and few diminutive sessile polyps with no stigmata of recent bleeding in the stomach with a polyp removed with cold forceps.  Grade B esophagitis was seen and 2 cm hiatal hernia.  Path report showed fundic gland polyp and no H. pylori.  She did egd in 2024.  Dec 27 2023L na 141 and k 3.8 amd cl 111 and co2 23 and bun 17 and cr 1,06 and glu 83 and alb 4.3 and alk 91 and ast 89 and alt 47 and 2023 alk 96 and ast 25 and alt 33.   They in the lamictal in between  and dec, She remains on the higher dose.  Lamictal livertox: Prospective studies suggest that less than 1% of subjects develop elevations in serum aminotransferase levels during long term lamotrigine therapy. However, clinically apparent hepatotoxicity from lamotrigine is well known and is estimated to occur in one in 2,000 to 10,000 treated patients. The liver injury is usually part of a systemic immuno-allergic reaction (anticonvulsant hypersensitivity syndrome [AHS] or drug rash with eosinophilia and systemic symptoms [DRESS] syndrome). The latency is typically short, ranging from one to several weeks. Presenting symptoms are a diffuse maculopapular rash, followed in a few days by high fever, nausea and vomiting. The rash can develop into a systemic hypersensitivity reaction and multiorgan failure or be associated with jaundice and hepatitis. Eosinophilia is common, and facial edema, lymphadenopathy and atypical lymphocytosis can occur. The pattern of liver enzyme elevations is usually hepatocellular and severity ranges from mild-to-moderate ALT elevations accompanying the generalized hypersensitivity reaction, to an icteric hepatitis, to a severe hepatitis and acute liver failure. Liver biopsy shows portal inflammation, hepatocellular necrosis and bile duct proliferation. In some instances of severe hypersensitivity syndrome with acute multiorgan failure, the hepatic involvement may represent ischemic injury.Lamotrigine has also been linked to rare instances of hemophagocytic lymphohistiocytosis, a rare and severe immune related reaction characterized by unremitting activation of CD8+ T cells and macrophages that causes multiorgan damage including liver injury, hepatitis and liver failure. Cases of HLH linked to lamotrigine arose within 1 to 4 weeks of starting the drug, usually in infants or children, marked clinically by fever, rash, cytopenias, hepatitis, high triglycerides and ferritin levels and bone marrow or liver histology demonstrating hemophagocytosisLikelihood score: A (well known cause of clinically apparent liver injury).  Xcopri she started it near end of 2023. Cenobamate: In controlled clinical trials, addition of cenobamate to standard anticonvulsant therapy was associated with transient, mild-to-moderate serum aminotransferase elevations in 1% to 4% of patients. In the preregistration trials of cenobamate, there were no cases of clinically apparent liver injury with jaundice. However, among 953 patients with exposure to cenobamate in these trials, there were three cases of DRESS syndrome accompanied by serum aminotransferase elevations, which arose during the first 3 to 6 weeks of treatment in subjects who had a relative rapid initial titration (4 to 6 weeks). In large open label studies using a more prolonged titration period (12 weeks), no instances of DRESS syndrome were reported among more than 1000 participants. Felbamate, a structurally related carbamate anticonvulsant, is a well-known cause of drug induced liver injury and has a boxed warning and restricted availability because of severe hypersensitivity reactions including acute liver failure and aplastic anemia. Thus, clinically significant liver injury from cenobamate may occur and can be severe, but is rare.Likelihood score: D (possible rare cause of clinically apparent liver injury in the context of generalized hypersensitivity syndrome).   Mtx: she started that and on it back last 8 months and that was newer and for rheumatism. Perlivertox Methotrexate is well known to cause serum aminotransferase elevations and long term therapy has been linked to development of fatty liver disease, fibrosis and even cirrhosis. The literature on methotrexate is extensive, but with great variability in rates of liver test and biopsy abnormalities at different doses, dose regimens and durations of therapy.With high dose intravenous methotrexate, serum ALT levels can rise to 10 to 20 times the upper limit of normal (ULN) within 12 to 48 hours, but levels then fall rapidly to normal with only rare instances of jaundice or symptoms of liver injury. With long term, low-to-moderate dose methotrexate therapy, elevations in serum ALT or AST values occur in 15% to 50% of patients, but are usually mild and self-limiting. Approximately 5% of patients have elevations greater than twice normal and these abnormalities resolve rapidly with discontinuation or dose modification, but can resolve even with continuation at the same dose level. The reported rate of ALT elevations during therapy has varied considerably, perhaps because of differences in frequency of determinations (every month vs every 3, 6 or 12 months) and due to the timing of the blood sampling (whether just before or soon after the once weekly dose). Finally, coadministration of folic acid has been shown to decrease the frequency of serum enzyme elevations and now is commonly used.Long term therapy with methotrexate has been associated with development of fatty liver and hepatic fibrosis and, in rare instances, portal hypertension and symptomatic cirrhosis. Symptoms are usually absent until cirrhosis is present, and liver tests are typically normal or minimally and transiently elevated. Routine monitoring of patients with regular liver biopsies done at 1 to 2 year intervals or with cumulative methotrexate doses of 1 to 10 grams demonstrates that approximately 30% of patients develop mild-to-moderate histological abnormalities (fat, cellular unrest, mild inflammation, nuclear atypical) and 2 to 20% of patients develop some degree of hepatic fibrosis. Well documented cases of cirrhosis arising during long term methotrexate therapy have been reported, but cirrhosis is rare in prospective series, even with routine histological monitoring. Patients who develop fibrosis on long term methotrexate therapy often have other risk factors for fatty liver disease, including excessive alcohol use, obesity, diabetes and concurrent administration of other potentially hepatotoxic agents. Use of high doses and daily methotrexate dosing is particularly associated with development of hepatic fibrosis and rates of cirrhosis of greater than 20% after 5 to 10 years of treatment. With more modern dose regimens (5 to 15 mg in one dose weekly with folate supplementation), fibrosis and clinically apparent liver disease are rare even with long term use. The hepatic fibrosis and cirrhosis due to methotrexate typically arise after 2 to 10 years of treatment and can present with ascites, variceal hemorrhage or hepatosplenomegaly. Some patients present with signs and symptoms of portal hypertension, yet have only moderate degrees of fibrosis, suggesting that methotrexate may also cause nodular regeneration. Patients who develop portal hypertension and cirrhosis usually have had minimal or no elevations in serum aminotransferase or alkaline phosphatase levels, and monitoring using serum enzymes appears to be poorly predictive of fibrosis development. Noninvasive markers of hepatic fibrosis, such as serial platelet counts, serum procollagen III aminoterminal peptide (PIIIP), serum bile acids, hepatic ultrasound, advanced imaging techniques and transient elastography may be more efficient in screening for fibrosis in patients on long term methotrexate, but the reliability and accuracy of these approaches has not been documented prospectively. Patients with cirrhosis due to methotrexate are often asymptomatic and the condition tends to be non-progressive, even in those who restart low dose therapy. Rare instances of hepatocellular carcinoma have been reported in patients with suspected methotrexate induced cirrhosis.Low dose, long term methotrexate therapy has also been implicated in rare instances of reactivation of hepatitis B in patients with rheumatoid arthritis or psoriasis who were HBsAg carriers, without HBeAg and with normal ALT levels and no detectable or low levels of HBV DNA before starting methotrexate. The frequency of reactivation with methotrexate is unknown, but is probably low. Reactivation typically presents after years of therapy with methotrexate and most published cases were also receiving corticosteroids. The clinical presentation is characterized by insidious onset of fatigue, nausea and jaundice accompanied by marked elevations in serum ALT and HBV DNA levels. In some instances, the acute injury is severe and progressive resulting in liver failure. In many case reports, reactivation occurred when methotrexate was withdrawn, perhaps as a result of restoration of immune reactivity in those in whom HBV DNA levels have risen during treatment. Reactivation has also been described in patients with antibodies to HBV without HBsAg (reverse seroconversion) treated with methotrexate and prednisone. The cases of reactivation of hepatitis B published in the literature have mostly resulted in death or emergency liver transplantation, perhaps reflecting publication bias for more severe cases. These cases have led to recommendations for routine screening for HBsAg before starting long term methotrexate therapy and prophylaxis with antiviral agents or careful monitoring for rises in HBV DNA levels if methotrexate is used. However, whether methotrexate on its own, without prednisone, can cause reactivation of hepatitis B is not clear.Likelihood score: A (well known cause of chronic, clinically significant liver injury, portal hypertension and cirrhosis).  She should let the rheumatologist know re the labs being up as the mtx and lamictal are the two most likely to do this.   [place] 2020 labs showed WBC 12.6, hemoglobin 13.1 platelet count 281 MCV 96.2 neutrophils 8.6 elevated lymphocytes 3.0 glucose 93 BUN 25 creatinine 1.16 sodium 137 potassium 4.4 calcium 8.4 AST 18 ALT 18 alk phos 91 bilirubin 0.3  [facility] ultrasound 2020 showed liver echogenicity increased suggesting fatty infiltration in the well-circumscribed hypoechoic lesion in the right lobe measuring 1.9 x 2.6 x 2 cm and approximately stable versus 2019 and allowing for slight differences in technique.  No gallstones seen.  Common bile duct 2 mm.  Partially visualized pancreas unremarkable.  Right kidney 10.2 cm with no hydronephrosis.  They recommended further characterization with MRI if not previously performed.  CT stone protocol done in 2020 showing stable 2.8 cm hypodense lesion posterior right lobe of the liver likely benign.  Gallbladder biliary tree normal and spleen normal.  Appendix not well-seen however no right lower quadrant inflammatory changes seen.  Pancreas normal as were adrenal glands and kidneys.  No aggressive osseous lesion but likely stable hemangioma at the lumbar 1 vertebral body also seen.  No renal stones.  Plan: 1. Pt will do mri in july. 2. She is seeking out new epilepsy. She is to do eeg and assess this. 3. Pt will see rheum re other than mtx issues. 4. She will activate the portal today. 5. She will see us in july post mri.  Duration of visit was 45 minutes with 10 minutes spent prepping chart and loading info for the visit to [facility] and then additional 35 minutes spent for this face to face visit reviewing chart and events and plans with the pt.

## 2024-04-08 ENCOUNTER — OV EP (OUTPATIENT)
Dept: URBAN - METROPOLITAN AREA CLINIC 17 | Facility: CLINIC | Age: 59
End: 2024-04-08
Payer: COMMERCIAL

## 2024-04-08 VITALS
HEIGHT: 64 IN | TEMPERATURE: 97.3 F | DIASTOLIC BLOOD PRESSURE: 70 MMHG | WEIGHT: 214.2 LBS | HEART RATE: 83 BPM | SYSTOLIC BLOOD PRESSURE: 107 MMHG | BODY MASS INDEX: 36.57 KG/M2

## 2024-04-08 DIAGNOSIS — D18.03 LIVER HEMANGIOMA: ICD-10-CM

## 2024-04-08 DIAGNOSIS — R10.13 DYSPEPSIA: ICD-10-CM

## 2024-04-08 DIAGNOSIS — K58.8 OTHER IRRITABLE BOWEL SYNDROME: ICD-10-CM

## 2024-04-08 DIAGNOSIS — K76.0 HEPATIC STEATOSIS: ICD-10-CM

## 2024-04-08 PROCEDURE — 99213 OFFICE O/P EST LOW 20 MIN: CPT | Performed by: INTERNAL MEDICINE

## 2024-04-08 RX ORDER — OMEPRAZOLE 40 MG/1
1 CAPSULE 30 MINUTES BEFORE MORNING MEAL CAPSULE, DELAYED RELEASE ORAL ONCE A DAY
Qty: 90 | Refills: 0 | Status: ON HOLD | COMMUNITY
Start: 2024-02-22

## 2024-04-08 RX ORDER — LAMOTRIGINE 100 MG/1
1 TABLET TABLET, ORALLY DISINTEGRATING ORAL TWICE A DAY
Status: ACTIVE | COMMUNITY

## 2024-04-08 RX ORDER — TOPIRAMATE 100 MG/1
1 IN AND 2 IN PM TABLET, FILM COATED ORAL TWICE A DAY
Status: ACTIVE | COMMUNITY

## 2024-04-08 RX ORDER — PLECANATIDE 3 MG/1
1 TABLET TABLET ORAL ONCE A DAY
Qty: 90 | Refills: 3 | Status: ACTIVE | COMMUNITY

## 2024-04-08 RX ORDER — LANSOPRAZOLE 30 MG/1
1 CAPSULE BEFORE A MEAL CAPSULE, DELAYED RELEASE ORAL TWICE A DAY
Qty: 180 CAPSULE | Refills: 3 | OUTPATIENT

## 2024-04-08 RX ORDER — CENOBAMATE 50 MG/1
1 TABLET TABLET, FILM COATED ORAL ONCE A DAY
Status: ACTIVE | COMMUNITY

## 2024-04-08 RX ORDER — FLUTICASONE PROPIONATE 50 UG/1
1 SPRAY IN EACH NOSTRIL SPRAY, METERED NASAL ONCE A DAY
Status: ACTIVE | COMMUNITY

## 2024-04-08 RX ORDER — FOLIC ACID 1 MG/1
1 TABLET TABLET ORAL ONCE A DAY
Status: ACTIVE | COMMUNITY

## 2024-04-08 RX ORDER — PROMETHAZINE HYDROCHLORIDE 25 MG/1
TABLET ORAL
Qty: 0 | Refills: 0 | Status: ACTIVE | COMMUNITY
Start: 1900-01-01

## 2024-04-08 RX ORDER — DICYCLOMINE HYDROCHLORIDE 10 MG/1
2 CAPSULES CAPSULE ORAL THREE TIMES A DAY
Status: ACTIVE | COMMUNITY

## 2024-04-08 RX ORDER — ATORVASTATIN CALCIUM 10 MG/1
1 TABLET TABLET, FILM COATED ORAL ONCE A DAY
Status: ACTIVE | COMMUNITY

## 2024-04-08 RX ORDER — SUMATRIPTAN SUCCINATE 100 MG/1
1 TABLET AT LEAST 2 HOURS BETWEEN DOSES AS NEEDED TABLET, FILM COATED ORAL TWICE A DAY
Status: ACTIVE | COMMUNITY

## 2024-04-08 NOTE — HPI-TODAY'S VISIT:
Pt is listed as being a 58-year-old female who was previously seen Dr. Fernandez in our group and seen  2024 by Ms Kwong and 2024 saw us and  sees DR Fleming for primary care and  came in to be seen for liver lesions.  A copy of the note will be sent to the referring provider.  She has  not had another scan noted since dec.  She did egd and was neg for celiac and trying to see.   labs showed WBC 6.4 hemoglobin 12.1 platelet count 265 MCV 98.7 sodium 144 potassium 4.0 chloride 114 CO2 22 BUN of 22 creatinine 1.05 glucose 58 bilirubin 0.3 alk phos 102 AST 27 ALT 56.   celiac panel noted to be negative.   labs show glucose 72 BUN 23 creatinine 1.08 elevated sodium 143 potassium 4.1 chloride 112 CO2 24 albumin 4.1 bilirubin 0.3 alk phos 92 AST of 16 ALT of 23 WBC 6.5 hemoglobin 12.6 plate count 249.  2/ ov Genna   labs show IgG normal at 997 and IgM normal at 78 which would suggest that the immune system is not at an abnormal level at this timeframe. You have no immunity seen to hepatitis B surface antibody and need to consider elective vaccination for same.   Smooth muscle antibody was negative as well as AMA. Sugar was normal at 85 BUN was normal at 19 with creatinine up to 1.12. Please share with primary provider. Sodium 142 potassium 4.3 normal. Chloride a little up at 111. CO2 of 24 with a calcium 9.2 and albumin 4.3 bilirubin 0.3 alk phos 92 and an AST of 18. ALT was 31 which was a little up. Ideal ALT is less than 25. Your SOFIA screen was positive but again as you saw immunoglobulins were not increased suggesting that while the SOFIA is positive it does not appear to be driving issues. WBC was 6.7 hemoglobin 12.7 plate count 265 MCV 97 with normal neutrophils and lymphocytes. Hep B core total negative indicating no prior exposure to hep B and hep B surface antigen negative indicating no active hep B. You are immune to hepatitis A however so that is good to know. Also your hep C antibody was negative which is also good to note.  She says that was on an arthritis med and not on it now.  Sees Rheum soon.  For comparison, 2023 labs showed AST of 89 and ALT of 47 so curiously labs are lower.  Certainly curious would be if the methotrexate dose timing and the drop of the labs correlated that would be important to note because shortly after the methotrexate dosing you would expect the labs to be higher and just prior to the next methotrexate dosing you would expect the labs to be lower. Please share these findings with your primary providers.  She will look into other than MTX options for this.  aniket  Has epiliepsy and she sees [facility] and she Nov/Dec  2023. That raised the concern re the liver.  Notes reviewed brenda and prince:   Patient listed as having a past medical history of appendectomy, hysterectomy, duodenitis, hepatic hemangioma, and IBS.  In the past has been treated with dicyclomine and Phenergan by her primary GI.  Patient also mentions having seizure disorder and cavernous malformation history. Followed by [facility] for that and last seizure recent and Tuesday of this week. She has them often and mainly at night.  Lumbar radiculopathy and cervical disc disease as well as hiatal hernia.  Other history included carpal tunnel, endometriosis, family history of breast cancer, fibrocystic breast disease, GERD, migraine, plantar fasciitis.  Other surgeries include brain surgery, , left knee surgery, colonoscopy and upper endoscopy.  Endometrial ablation, hysterectomy, shoulder surgery, total abdominal hysterectomy and bilateral salpingo-oophorectomy. Foot surgery.  Family history of sister with breast cancer and leukemia and she passed. Mother with history of heart disease cervical cancer hypertension arthritis.  Social history not a smoker not a drinker.  Medicines listed included albuterol 90 mcg inhaler as needed, dicyclomine 10 mg as needed, Flonase 50 mcg as needed.  Lamictal 100 mg twice a day, Trileptal 300 mg twice a day, pantoprazole 40 mL a day, Trulance 3 mg a day, Phenergan 25 mg as needed.  Allergies to aspirin, Cipro, Keppra which cause itching, and IV iodine contrast which caused a rash.  She previously had a CT contrast rash reaction responded to Benadryl.  Weight when last seen at [place] had been 101.2 kg with a BMI of 38.28. She says 213.  Labs then showed RBC count 3.73, neutrophils 4.9, creatinine 1.05 chloride 111 bilirubin 0.2.  Mri done 1.5 weeks ago.  Dec 2023: ct: FINDINGS: Normal heart size with trace pericardial effusion. Lung bases are clear. Indeterminant somewhat ill-defined hypodense subcapsular lesion in the right hepatic lobe measuring 2.8 x 2.5 cm (series 2 image 23). Gallbladder and biliary tree are normal. Spleen is unremarkable. Mild fatty atrophy of the pancreas without ductal dilation or inflammation. No adrenal nodule. Kidneys are symmetric in size without contour deforming lesion, hydronephrosis or urolithiasis.  Air-fluid level in the distal esophagus may suggest reflux. Stomach is mostly decompressed. No bowel obstruction. The appendix is not clearly identified (she does not have), system put reported clinical history of prior appendectomy. Mild colonic fecal loading. No free fluid or free air. Normal caliber abdominal aorta. No abdominal adenopathy. Urinary bladder is normal. Uterus is absent. No suspicious adnexal lesion, free pelvic fluid or enlarged pelvic adenopathy. Small trabeculated lucency in the posterior T9 vertebral body favors an osseous hemangioma. No acute fracture or aggressive osseous lesion. IMPRESSION:1. Indeterminant somewhat ill-defined hypodense subcapsular lesion in the right hepatic lobe measuring 2.8 x 2.5 cm. Benign and malignant etiologies are considered possible for this lesion, which should be completely characterized with a short interval nonemergent abdominal MRI with contrast. 2. No additional acute findings in the abdomen or pelvis to suggest etiology for flank pain. Specifically, no urolithiasis. Appendix is not seen, consistent with reported history of appendectomy.3. Mild colonic fecal loading. Air-fluid level in the distal esophagus may suggest reflux  Mri 2024: FINDINGS: Lower Thorax: Normal. Liver: No fat or iron. Segment 6 2.9 x 2.4 cm (5:21) T2 hyperintense T1 isointense lesion with progressive discontinuous nodular peripheral enhancement consistent with hemangioma.   Gallbladder/Biliary Tree: Normal. Spleen: Normal. Pancreas: Normal. Adrenal Glands: Normal.  Kidneys/Ureters: Normal. Gastrointestinal: Normal.  Lymph Nodes: Normal  Vessels: Normal. Peritoneum/Retroperitoneum: Normal.  Bones/Soft Tissues: Degenerative changes of spine without aggressive osseous lesion. IMPRESSION:Segment 6 hepatic hemangioma. No concerning hepatic lesions. The images were reviewed and interpreted by Carmen Booth MD.  CT scan in 2022 ER visit showed no acute malady in the abdomen or pelvis but there was a 2.1 cm hypodensity in the posterior right lobe which was felt unchanged versus 2020 while incomplete characterize could be hemangioma.  CT head at the time showed a subcentimeter hypodensity in the left medial temporal lobe that was seen in 2020 that appeared to be slightly decreased in size and may have been suspicious for an underlying cavernous transformation area.  No gray-white loss seen and no hydrocephalus.  Patient had EGD 2020 that showed no gross lesions and few diminutive sessile polyps with no stigmata of recent bleeding in the stomach with a polyp removed with cold forceps.  Grade B esophagitis was seen and 2 cm hiatal hernia.  Path report showed fundic gland polyp and no H. pylori.  She did egd in 2024.  Dec 27 2023L na 141 and k 3.8 amd cl 111 and co2 23 and bun 17 and cr 1,06 and glu 83 and alb 4.3 and alk 91 and ast 89 and alt 47 and 2023 alk 96 and ast 25 and alt 33.   They in the lamictal in between  and dec, She remains on the higher dose.  Lamictal livertox: Prospective studies suggest that less than 1% of subjects develop elevations in serum aminotransferase levels during long term lamotrigine therapy. However, clinically apparent hepatotoxicity from lamotrigine is well known and is estimated to occur in one in 2,000 to 10,000 treated patients. The liver injury is usually part of a systemic immuno-allergic reaction (anticonvulsant hypersensitivity syndrome [AHS] or drug rash with eosinophilia and systemic symptoms [DRESS] syndrome). The latency is typically short, ranging from one to several weeks. Presenting symptoms are a diffuse maculopapular rash, followed in a few days by high fever, nausea and vomiting. The rash can develop into a systemic hypersensitivity reaction and multiorgan failure or be associated with jaundice and hepatitis. Eosinophilia is common, and facial edema, lymphadenopathy and atypical lymphocytosis can occur. The pattern of liver enzyme elevations is usually hepatocellular and severity ranges from mild-to-moderate ALT elevations accompanying the generalized hypersensitivity reaction, to an icteric hepatitis, to a severe hepatitis and acute liver failure. Liver biopsy shows portal inflammation, hepatocellular necrosis and bile duct proliferation. In some instances of severe hypersensitivity syndrome with acute multiorgan failure, the hepatic involvement may represent ischemic injury.Lamotrigine has also been linked to rare instances of hemophagocytic lymphohistiocytosis, a rare and severe immune related reaction characterized by unremitting activation of CD8+ T cells and macrophages that causes multiorgan damage including liver injury, hepatitis and liver failure. Cases of HLH linked to lamotrigine arose within 1 to 4 weeks of starting the drug, usually in infants or children, marked clinically by fever, rash, cytopenias, hepatitis, high triglycerides and ferritin levels and bone marrow or liver histology demonstrating hemophagocytosisLikelihood score: A (well known cause of clinically apparent liver injury).  Xcopri she started it near end of 2023. Cenobamate: In controlled clinical trials, addition of cenobamate to standard anticonvulsant therapy was associated with transient, mild-to-moderate serum aminotransferase elevations in 1% to 4% of patients. In the preregistration trials of cenobamate, there were no cases of clinically apparent liver injury with jaundice. However, among 953 patients with exposure to cenobamate in these trials, there were three cases of DRESS syndrome accompanied by serum aminotransferase elevations, which arose during the first 3 to 6 weeks of treatment in subjects who had a relative rapid initial titration (4 to 6 weeks). In large open label studies using a more prolonged titration period (12 weeks), no instances of DRESS syndrome were reported among more than 1000 participants. Felbamate, a structurally related carbamate anticonvulsant, is a well-known cause of drug induced liver injury and has a boxed warning and restricted availability because of severe hypersensitivity reactions including acute liver failure and aplastic anemia. Thus, clinically significant liver injury from cenobamate may occur and can be severe, but is rare.Likelihood score: D (possible rare cause of clinically apparent liver injury in the context of generalized hypersensitivity syndrome).   Mtx: she started that and on it back last 8 months and that was newer and for rheumatism. Perlivertox Methotrexate is well known to cause serum aminotransferase elevations and long term therapy has been linked to development of fatty liver disease, fibrosis and even cirrhosis. The literature on methotrexate is extensive, but with great variability in rates of liver test and biopsy abnormalities at different doses, dose regimens and durations of therapy.With high dose intravenous methotrexate, serum ALT levels can rise to 10 to 20 times the upper limit of normal (ULN) within 12 to 48 hours, but levels then fall rapidly to normal with only rare instances of jaundice or symptoms of liver injury. With long term, low-to-moderate dose methotrexate therapy, elevations in serum ALT or AST values occur in 15% to 50% of patients, but are usually mild and self-limiting. Approximately 5% of patients have elevations greater than twice normal and these abnormalities resolve rapidly with discontinuation or dose modification, but can resolve even with continuation at the same dose level. The reported rate of ALT elevations during therapy has varied considerably, perhaps because of differences in frequency of determinations (every month vs every 3, 6 or 12 months) and due to the timing of the blood sampling (whether just before or soon after the once weekly dose). Finally, coadministration of folic acid has been shown to decrease the frequency of serum enzyme elevations and now is commonly used.Long term therapy with methotrexate has been associated with development of fatty liver and hepatic fibrosis and, in rare instances, portal hypertension and symptomatic cirrhosis. Symptoms are usually absent until cirrhosis is present, and liver tests are typically normal or minimally and transiently elevated. Routine monitoring of patients with regular liver biopsies done at 1 to 2 year intervals or with cumulative methotrexate doses of 1 to 10 grams demonstrates that approximately 30% of patients develop mild-to-moderate histological abnormalities (fat, cellular unrest, mild inflammation, nuclear atypical) and 2 to 20% of patients develop some degree of hepatic fibrosis. Well documented cases of cirrhosis arising during long term methotrexate therapy have been reported, but cirrhosis is rare in prospective series, even with routine histological monitoring. Patients who develop fibrosis on long term methotrexate therapy often have other risk factors for fatty liver disease, including excessive alcohol use, obesity, diabetes and concurrent administration of other potentially hepatotoxic agents. Use of high doses and daily methotrexate dosing is particularly associated with development of hepatic fibrosis and rates of cirrhosis of greater than 20% after 5 to 10 years of treatment. With more modern dose regimens (5 to 15 mg in one dose weekly with folate supplementation), fibrosis and clinically apparent liver disease are rare even with long term use. The hepatic fibrosis and cirrhosis due to methotrexate typically arise after 2 to 10 years of treatment and can present with ascites, variceal hemorrhage or hepatosplenomegaly. Some patients present with signs and symptoms of portal hypertension, yet have only moderate degrees of fibrosis, suggesting that methotrexate may also cause nodular regeneration. Patients who develop portal hypertension and cirrhosis usually have had minimal or no elevations in serum aminotransferase or alkaline phosphatase levels, and monitoring using serum enzymes appears to be poorly predictive of fibrosis development. Noninvasive markers of hepatic fibrosis, such as serial platelet counts, serum procollagen III aminoterminal peptide (PIIIP), serum bile acids, hepatic ultrasound, advanced imaging techniques and transient elastography may be more efficient in screening for fibrosis in patients on long term methotrexate, but the reliability and accuracy of these approaches has not been documented prospectively. Patients with cirrhosis due to methotrexate are often asymptomatic and the condition tends to be non-progressive, even in those who restart low dose therapy. Rare instances of hepatocellular carcinoma have been reported in patients with suspected methotrexate induced cirrhosis.Low dose, long term methotrexate therapy has also been implicated in rare instances of reactivation of hepatitis B in patients with rheumatoid arthritis or psoriasis who were HBsAg carriers, without HBeAg and with normal ALT levels and no detectable or low levels of HBV DNA before starting methotrexate. The frequency of reactivation with methotrexate is unknown, but is probably low. Reactivation typically presents after years of therapy with methotrexate and most published cases were also receiving corticosteroids. The clinical presentation is characterized by insidious onset of fatigue, nausea and jaundice accompanied by marked elevations in serum ALT and HBV DNA levels. In some instances, the acute injury is severe and progressive resulting in liver failure. In many case reports, reactivation occurred when methotrexate was withdrawn, perhaps as a result of restoration of immune reactivity in those in whom HBV DNA levels have risen during treatment. Reactivation has also been described in patients with antibodies to HBV without HBsAg (reverse seroconversion) treated with methotrexate and prednisone. The cases of reactivation of hepatitis B published in the literature have mostly resulted in death or emergency liver transplantation, perhaps reflecting publication bias for more severe cases. These cases have led to recommendations for routine screening for HBsAg before starting long term methotrexate therapy and prophylaxis with antiviral agents or careful monitoring for rises in HBV DNA levels if methotrexate is used. However, whether methotrexate on its own, without prednisone, can cause reactivation of hepatitis B is not clear.Likelihood score: A (well known cause of chronic, clinically significant liver injury, portal hypertension and cirrhosis).  She should let the rheumatologist know re the labs being up as the mtx and lamictal are the two most likely to do this.   [place] 2020 labs showed WBC 12.6, hemoglobin 13.1 platelet count 281 MCV 96.2 neutrophils 8.6 elevated lymphocytes 3.0 glucose 93 BUN 25 creatinine 1.16 sodium 137 potassium 4.4 calcium 8.4 AST 18 ALT 18 alk phos 91 bilirubin 0.3  [facility] ultrasound 2020 showed liver echogenicity increased suggesting fatty infiltration in the well-circumscribed hypoechoic lesion in the right lobe measuring 1.9 x 2.6 x 2 cm and approximately stable versus 2019 and allowing for slight differences in technique.  No gallstones seen.  Common bile duct 2 mm.  Partially visualized pancreas unremarkable.  Right kidney 10.2 cm with no hydronephrosis.  They recommended further characterization with MRI if not previously performed.  CT stone protocol done in 2020 showing stable 2.8 cm hypodense lesion posterior right lobe of the liver likely benign.  Gallbladder biliary tree normal and spleen normal.  Appendix not well-seen however no right lower quadrant inflammatory changes seen.  Pancreas normal as were adrenal glands and kidneys.  No aggressive osseous lesion but likely stable hemangioma at the lumbar 1 vertebral body also seen.  No renal stones.  Plan: 1. Pt will do mri in july. 2. She is seeking out new epilepsy. She is to do eeg and assess this. 3. Pt will see rheum re other than mtx issues. 4. She will activate the portal today. 5. She will see us in july post mri.  Duration of visit was 45 minutes with 10 minutes spent prepping chart and loading info for the visit to [facility] and then additional 35 minutes spent for this face to face visit reviewing chart and events and plans with the pt.

## 2024-04-08 NOTE — HPI-OTHER HISTORIES
53 yo lady pt of dR Hayde Marinelli. She is a pt of DR Morales and wants a second opinion for IBS. She was diagnosed IBS M she says 15 yrs ago. Says in the last year has been mostly constipation. She has about 2 BMs a week spontaneously, hard stools with straining with incomplete evacuation. Per Dr Morales notes, she took 290 mcg 3 times a week due to diarrhea. 145 mcg took 3-4 days to work. She had a normal colonosocpy per his notes in 2015 and was told to repeat in 10 yrs. "I had some polyps a long long long time ago". SHe had R ,mid abdominal pain. Pain is present when she has to have a BM "I am bent over" and then it lets up a bit she says. Per Dr Morales note, a CT abd pelvis in 7/2018 showing stool in RUQ. SHe sometimes wakes up in the am with nausea and vomiting . Mostly it is retching as she has an empty stomach. She says throwing up "is better than what it was, its just the pain". She takes pantoprazole for acid reflux which is controlled. She takes dicyclomine and carafate and peptobismol as needed. She has not needed carafate in 2 weeks. She takes PPI daily and dicyclomine "all the time". Results of CBC and CMP from 7/2019 were unremarkable.  1/14/20 discussed CT from 7/2019 and US from 12/2019. discussed results in detail. I do not have EGD and colon reports from DR Morales yet. On trulance she did not have any abdominal pain. Likely 2/2 IBS C She ran out of samples and has not been able to afford it at this time.  3/12/20 Here for f/u visit. She had a viral infection recently with vomiting. Went to ER - given Zofran, toradol, morphine. Abdominal pain was so severe in RLQ. She had a CT - unremarkable. Pain is still present "but it dont hurt that bad". C/o  burning in her throat "all the time after the episode". No dysphagia. 3/5/20 CT mild thickening of small bowel loops diffusely with few air fluid levels ? mild enteritis. CBC and CMP unremarkable.  5/21/20 doing "a little better". She has been taking linzess 145 "it had stopped working and then I had to take dulcolax for it to work". My notes state that she had been controlled on Trulance. No notes to indicate that she was switched to Linzess. She will go home, check her meds and let us know what she is on. Dicyclomine helps her pain - but makes her go to sleep so she only takes it at night. She has never taken Amitiza.  9/8/20 Says she has brain cavernous malformations and "they may need to do surgery". Says she takes Trulance 3 mg 2-3 times a week. "it cleans out good". She is here with her Aunt "the brain thing makes stutter sometimes". Pantorpazole controls her reflux  12/10/20 Was recently diagnosed with epilepsy. She cant drive for 6 months. Her  has been very supportive.  Is now  on oxacarbazepine, topiramate, and prednisone. She takes Trulance 3 mg 2-3 times a week, she has a BM qod.  She c/o R flank pain "sometimes I cant walk good, it is so bad it makes me cry".  She was in the ED 12/7 for same.  CT stone protocol stable 2.8 cm liver lesion. ; stable L1 hemangioma in vertebral body.  It was thought msk and she was given a lidoderm patch with improvement.   11/23/21 Here for f.u visit Ran out of Pantoprazole 40 mg which usually controls her reflux Need Trulance also and needs samples/script. Says she wanted also to update me on her health. Still not driving as she kept having seizures. She says brain surgery is being discussed.   5/19/22 Here for f.u visit SAys had brain surgery 1/2022 and has had seizures afterwards. Will  need more surgery WAs in ER about 3 months ago for R sided abd pain. now resolved. [facility].  Ran out of Trulance - needs a script.  Takes Pantoprazole daily which controls her reflux/ Says her recent weight gain makes it imperative that she takes her PPI daily.  Rarely needs to take dicyclomine now  8/8/22 Here for f.u visit with her .  SAys over the past month her PPI has not been working she also ran out 1 weeks ago. She is coughing all night. Says her throat burns with nausea.  Has been taking  a lot of pecid - has to take 2 or more.  No vomiting.  Taking Trulance at night which is working v well. Has doubled up on her epilepsy meds per her MD instructions so she is sleeping all day and lying down all day. After meds and meals included.  9/12/22 Here for f.u visit.  drove her but not at visit Says PPI bid is helping much better. Trying not to lay down a lot especially after eating. Takes pepcid prn. Says has a HA today  3/9/23 Here for routine f/u visit Doing some testing for her L rotator cuff mzl. SAys her stomach makes a lot of noise when I asked how her stomach is doing Take Pantoprazole 40 mg daily.  Says she is irregular with BMs.  Says her constipation medicine works very well when she takes it.  8/28/23 Her lamotrigrine has been increased to 200 mg for her epilepsy - says seizures are worse and she has been going to her ER.  Alternates from constipation and loose stools. Takes Trulance qod 'says because it burns my booty hole".  11/9/23 Her father recently passed away. Says " I need an endoscopy, I feel like everything gets caught in my throat and then I thow up then when I lay down it burns and gets hot and I feel like I am going to puke" Takes lansoprazole 30 mg in the AM. Taking 3 hours before eating.  EAting dinner at 6 pm usually. Snacks till 8/.30-9pm. lays down about 10 pm.  Trulance is working has a BM daily.  4/8/24 Here malina f.u visit Says reflux is "getting better" NOw taking lansoprazole 30 mg daily . "I am trying not to eat later".  Discussed EGD and path and answered questions. Uses air fryer a lot and eats at [employer] " i have decided to let them do stereo EEG on me"

## 2024-05-16 ENCOUNTER — LAB OUTSIDE AN ENCOUNTER (OUTPATIENT)
Dept: URBAN - METROPOLITAN AREA CLINIC 17 | Facility: CLINIC | Age: 59
End: 2024-05-16

## 2024-05-16 ENCOUNTER — TELEPHONE ENCOUNTER (OUTPATIENT)
Dept: URBAN - METROPOLITAN AREA CLINIC 17 | Facility: CLINIC | Age: 59
End: 2024-05-16

## 2024-05-20 ENCOUNTER — TELEPHONE ENCOUNTER (OUTPATIENT)
Dept: URBAN - METROPOLITAN AREA CLINIC 86 | Facility: CLINIC | Age: 59
End: 2024-05-20

## 2024-05-20 ENCOUNTER — LAB OUTSIDE AN ENCOUNTER (OUTPATIENT)
Dept: URBAN - METROPOLITAN AREA CLINIC 86 | Facility: CLINIC | Age: 59
End: 2024-05-20

## 2024-06-12 ENCOUNTER — P2P PATIENT RECORD (OUTPATIENT)
Age: 59
End: 2024-06-12

## 2024-07-01 ENCOUNTER — LAB OUTSIDE AN ENCOUNTER (OUTPATIENT)
Dept: URBAN - METROPOLITAN AREA CLINIC 86 | Facility: CLINIC | Age: 59
End: 2024-07-01

## 2024-07-08 ENCOUNTER — LAB OUTSIDE AN ENCOUNTER (OUTPATIENT)
Dept: URBAN - METROPOLITAN AREA CLINIC 86 | Facility: CLINIC | Age: 59
End: 2024-07-08

## 2024-07-17 ENCOUNTER — OFFICE VISIT (OUTPATIENT)
Dept: URBAN - METROPOLITAN AREA CLINIC 86 | Facility: CLINIC | Age: 59
End: 2024-07-17

## 2024-07-17 ENCOUNTER — TELEPHONE ENCOUNTER (OUTPATIENT)
Dept: URBAN - METROPOLITAN AREA CLINIC 86 | Facility: CLINIC | Age: 59
End: 2024-07-17

## 2024-07-17 RX ORDER — TOPIRAMATE 100 MG/1
1 IN AND 2 IN PM TABLET, FILM COATED ORAL TWICE A DAY
Status: ACTIVE | COMMUNITY

## 2024-07-17 RX ORDER — SUMATRIPTAN SUCCINATE 100 MG/1
1 TABLET AT LEAST 2 HOURS BETWEEN DOSES AS NEEDED TABLET, FILM COATED ORAL TWICE A DAY
Status: ACTIVE | COMMUNITY

## 2024-07-17 RX ORDER — OMEPRAZOLE 40 MG/1
1 CAPSULE 30 MINUTES BEFORE MORNING MEAL CAPSULE, DELAYED RELEASE ORAL ONCE A DAY
Qty: 90 | Refills: 0 | Status: ON HOLD | COMMUNITY
Start: 2024-02-22

## 2024-07-17 RX ORDER — LAMOTRIGINE 100 MG/1
1 TABLET TABLET, ORALLY DISINTEGRATING ORAL TWICE A DAY
Status: ACTIVE | COMMUNITY

## 2024-07-17 RX ORDER — ATORVASTATIN CALCIUM 10 MG/1
1 TABLET TABLET, FILM COATED ORAL ONCE A DAY
Status: ACTIVE | COMMUNITY

## 2024-07-17 RX ORDER — CENOBAMATE 50 MG/1
1 TABLET TABLET, FILM COATED ORAL ONCE A DAY
Status: ACTIVE | COMMUNITY

## 2024-07-17 RX ORDER — FOLIC ACID 1 MG/1
1 TABLET TABLET ORAL ONCE A DAY
Status: ACTIVE | COMMUNITY

## 2024-07-17 RX ORDER — DICYCLOMINE HYDROCHLORIDE 10 MG/1
2 CAPSULES CAPSULE ORAL THREE TIMES A DAY
Status: ACTIVE | COMMUNITY

## 2024-07-17 RX ORDER — FLUTICASONE PROPIONATE 50 UG/1
1 SPRAY IN EACH NOSTRIL SPRAY, METERED NASAL ONCE A DAY
Status: ACTIVE | COMMUNITY

## 2024-07-17 RX ORDER — PLECANATIDE 3 MG/1
1 TABLET TABLET ORAL ONCE A DAY
Qty: 90 | Refills: 3 | Status: ACTIVE | COMMUNITY

## 2024-07-17 RX ORDER — LANSOPRAZOLE 30 MG/1
1 CAPSULE BEFORE A MEAL CAPSULE, DELAYED RELEASE ORAL TWICE A DAY
Qty: 180 CAPSULE | Refills: 3 | Status: ACTIVE | COMMUNITY

## 2024-07-17 RX ORDER — PROMETHAZINE HYDROCHLORIDE 25 MG/1
TABLET ORAL
Qty: 0 | Refills: 0 | Status: ACTIVE | COMMUNITY
Start: 1900-01-01

## 2024-07-17 NOTE — HPI-TODAY'S VISIT:
Pt is listed as being a 59-year-old female who was previously seen 2024 by us and also sees Dr. Fernandez in our group and sees DR Fleming for primary care and  came in to be seen for liver lesions. A copy of the note will be sent to the referring provider. Dear Jonnie Farah , May 19 MRI was done with no contrast so am glad done that way given our discussion of the other day. Lower chest views shows lower thorax normal. Liver with normal morphology and no significant fat or iron deposition within the liver fat fraction 2.3%. Stable hemangioma was seen in segment 6 of the liver measuring 3.0 x 2.4 cm versus January. Some punctate hepatic cysts were seen. Gallbladder/biliary, spleen, adrenal glands, kidneys, gastrointestinal views, lymph nodes, and vessels as well as peritoneum were all normal which is good to know. In the pancreas he did have mild pancreatic parenchymal atrophy that was seen. Bone and soft tissue views showed unchanged intraosseous hemangiomas within the thoracolumbar spine. Please share with primary provider to be aware of. In summary, stable hepatic segment 6 hemangioma seen with normal liver morphology and without significant liver fat. I do not at this poing see a reason to redo MRI  with contrast at this point so this report was sufficient for what we wanted to check on. We will attempt next scan to be u.s to follow the area over time. Dr. Wilburn  She has  not had another scan noted since dec. She did egd and was neg for celiac and trying to see.  labs showed WBC 6.4 hemoglobin 12.1 platelet count 265 MCV 98.7 sodium 144 potassium 4.0 chloride 114 CO2 22 BUN of 22 creatinine 1.05 glucose 58 bilirubin 0.3 alk phos 102 AST 27 ALT 56.  celiac panel noted to be negative.  labs show glucose 72 BUN 23 creatinine 1.08 elevated sodium 143 potassium 4.1 chloride 112 CO2 24 albumin 4.1 bilirubin 0.3 alk phos 92 AST of 16 ALT of 23 WBC 6.5 hemoglobin 12.6 plate count 249. 2/ ov Genna  labs show IgG normal at 997 and IgM normal at 78 which would suggest that the immune system is not at an abnormal level at this timeframe. You have no immunity seen to hepatitis B surface antibody and need to consider elective vaccination for same.  Smooth muscle antibody was negative as well as AMA. Sugar was normal at 85 BUN was normal at 19 with creatinine up to 1.12. Please share with primary provider. Sodium 142 potassium 4.3 normal. Chloride a little up at 111. CO2 of 24 with a calcium 9.2 and albumin 4.3 bilirubin 0.3 alk phos 92 and an AST of 18. ALT was 31 which was a little up. Ideal ALT is less than 25. Your SOFIA screen was positive but again as you saw immunoglobulins were not increased suggesting that while the SOFIA is positive it does not appear to be driving issues. WBC was 6.7 hemoglobin 12.7 plate count 265 MCV 97 with normal neutrophils and lymphocytes. Hep B core total negative indicating no prior exposure to hep B and hep B surface antigen negative indicating no active hep B. You are immune to hepatitis A however so that is good to know. Also your hep C antibody was negative which is also good to note. She says that was on an arthritis med and not on it now. Sees Rheum soon. For comparison, 2023 labs showed AST of 89 and ALT of 47 so curiously labs are lower. Certainly curious would be if the methotrexate dose timing and the drop of the labs correlated that would be important to note because shortly after the methotrexate dosing you would expect the labs to be higher and just prior to the next methotrexate dosing you would expect the labs to be lower. Please share these findings with your primary providers. She will look into other than MTX options for this. aniket Has epiliepsy and she sees [facility] and she Nov/Dec  2023. That raised the concern re the liver. Notes reviewed brenda and prince:  Patient listed as having a past medical history of appendectomy, hysterectomy, duodenitis, hepatic hemangioma, and IBS. In the past has been treated with dicyclomine and Phenergan by her primary GI. Patient also mentions having seizure disorder and cavernous malformation history. Followed by [facility] for that and last seizure recent and Tuesday of this week. She has them often and mainly at night. Lumbar radiculopathy and cervical disc disease as well as hiatal hernia. Other history included carpal tunnel, endometriosis, family history of breast cancer, fibrocystic breast disease, GERD, migraine, plantar fasciitis. Other surgeries include brain surgery, , left knee surgery, colonoscopy and upper endoscopy. Endometrial ablation, hysterectomy, shoulder surgery, total abdominal hysterectomy and bilateral salpingo-oophorectomy. Foot surgery. Family history of sister with breast cancer and leukemia and she passed. Mother with history of heart disease cervical cancer hypertension arthritis. Social history not a smoker not a drinker. Medicines listed included albuterol 90 mcg inhaler as needed, dicyclomine 10 mg as needed, Flonase 50 mcg as needed. Lamictal 100 mg twice a day, Trileptal 300 mg twice a day, pantoprazole 40 mL a day, Trulance 3 mg a day, Phenergan 25 mg as needed. Allergies to aspirin, Cipro, Keppra which cause itching, and IV iodine contrast which caused a rash. She previously had a CT contrast rash reaction responded to Benadryl. Weight when last seen at [facility] had been 101.2 kg with a BMI of 38.28. She says 213. Labs then showed RBC count 3.73, neutrophils 4.9, creatinine 1.05 chloride 111 bilirubin 0.2. Mri done 1.5 weeks ago. Dec 2023: ct: FINDINGS: Normal heart size with trace pericardial effusion. Lung bases are clear. Indeterminant somewhat ill-defined hypodense subcapsular lesion in the right hepatic lobe measuring 2.8 x 2.5 cm (series 2 image 23). Gallbladder and biliary tree are normal. Spleen is unremarkable. Mild fatty atrophy of the pancreas without ductal dilation or inflammation. No adrenal nodule. Kidneys are symmetric in size without contour deforming lesion, hydronephrosis or urolithiasis. Air-fluid level in the distal esophagus may suggest reflux. Stomach is mostly decompressed. No bowel obstruction. The appendix is not clearly identified (she does not have), system put reported clinical history of prior appendectomy. Mild colonic fecal loading. No free fluid or free air. Normal caliber abdominal aorta. No abdominal adenopathy. Urinary bladder is normal. Uterus is absent. No suspicious adnexal lesion, free pelvic fluid or enlarged pelvic adenopathy. Small trabeculated lucency in the posterior T9 vertebral body favors an osseous hemangioma. No acute fracture or aggressive osseous lesion. IMPRESSION:1. Indeterminant somewhat ill-defined hypodense subcapsular lesion in the right hepatic lobe measuring 2.8 x 2.5 cm. Benign and malignant etiologies are considered possible for this lesion, which should be completely characterized with a short interval nonemergent abdominal MRI with contrast. 2. No additional acute findings in the abdomen or pelvis to suggest etiology for flank pain. Specifically, no urolithiasis. Appendix is not seen, consistent with reported history of appendectomy.3. Mild colonic fecal loading. Air-fluid level in the distal esophagus may suggest reflux Mri 2024: FINDINGS: Lower Thorax: Normal. Liver: No fat or iron. Segment 6 2.9 x 2.4 cm (5:21) T2 hyperintense T1 isointense lesion with progressive discontinuous nodular peripheral enhancement consistent with hemangioma.   Gallbladder/Biliary Tree: Normal. Spleen: Normal. Pancreas: Normal. Adrenal Glands: Normal.  Kidneys/Ureters: Normal. Gastrointestinal: Normal.  Lymph Nodes: Normal Vessels: Normal. Peritoneum/Retroperitoneum: Normal. Bones/Soft Tissues: Degenerative changes of spine without aggressive osseous lesion. IMPRESSION:Segment 6 hepatic hemangioma. No concerning hepatic lesions. The images were reviewed and interpreted by Carmen Booth MD. CT scan in 2022 ER visit showed no acute malady in the abdomen or pelvis but there was a 2.1 cm hypodensity in the posterior right lobe which was felt unchanged versus 2020 while incomplete characterize could be hemangioma. CT head at the time showed a subcentimeter hypodensity in the left medial temporal lobe that was seen in 2020 that appeared to be slightly decreased in size and may have been suspicious for an underlying cavernous transformation area. No gray-white loss seen and no hydrocephalus. Patient had EGD 2020 that showed no gross lesions and few diminutive sessile polyps with no stigmata of recent bleeding in the stomach with a polyp removed with cold forceps. Grade B esophagitis was seen and 2 cm hiatal hernia. Path report showed fundic gland polyp and no H. pylori. She did egd in 2024. Dec 27 2023L na 141 and k 3.8 amd cl 111 and co2 23 and bun 17 and cr 1,06 and glu 83 and alb 4.3 and alk 91 and ast 89 and alt 47 and 2023 alk 96 and ast 25 and alt 33.  They in the lamictal in between  and dec, She remains on the higher dose. Lamictal livertox: Prospective studies suggest that less than 1% of subjects develop elevations in serum aminotransferase levels during long term lamotrigine therapy. However, clinically apparent hepatotoxicity from lamotrigine is well known and is estimated to occur in one in 2,000 to 10,000 treated patients. The liver injury is usually part of a systemic immuno-allergic reaction (anticonvulsant hypersensitivity syndrome [AHS] or drug rash with eosinophilia and systemic symptoms [DRESS] syndrome). The latency is typically short, ranging from one to several weeks. Presenting symptoms are a diffuse maculopapular rash, followed in a few days by high fever, nausea and vomiting. The rash can develop into a systemic hypersensitivity reaction and multiorgan failure or be associated with jaundice and hepatitis. Eosinophilia is common, and facial edema, lymphadenopathy and atypical lymphocytosis can occur. The pattern of liver enzyme elevations is usually hepatocellular and severity ranges from mild-to-moderate ALT elevations accompanying the generalized hypersensitivity reaction, to an icteric hepatitis, to a severe hepatitis and acute liver failure. Liver biopsy shows portal inflammation, hepatocellular necrosis and bile duct proliferation. In some instances of severe hypersensitivity syndrome with acute multiorgan failure, the hepatic involvement may represent ischemic injury.Lamotrigine has also been linked to rare instances of hemophagocytic lymphohistiocytosis, a rare and severe immune related reaction characterized by unremitting activation of CD8+ T cells and macrophages that causes multiorgan damage including liver injury, hepatitis and liver failure. Cases of HLH linked to lamotrigine arose within 1 to 4 weeks of starting the drug, usually in infants or children, marked clinically by fever, rash, cytopenias, hepatitis, high triglycerides and ferritin levels and bone marrow or liver histology demonstrating hemophagocytosisLikelihood score: A (well known cause of clinically apparent liver injury). Xcopri she started it near end of 2023. Cenobamate: In controlled clinical trials, addition of cenobamate to standard anticonvulsant therapy was associated with transient, mild-to-moderate serum aminotransferase elevations in 1% to 4% of patients. In the preregistration trials of cenobamate, there were no cases of clinically apparent liver injury with jaundice. However, among 953 patients with exposure to cenobamate in these trials, there were three cases of DRESS syndrome accompanied by serum aminotransferase elevations, which arose during the first 3 to 6 weeks of treatment in subjects who had a relative rapid initial titration (4 to 6 weeks). In large open label studies using a more prolonged titration period (12 weeks), no instances of DRESS syndrome were reported among more than 1000 participants. Felbamate, a structurally related carbamate anticonvulsant, is a well-known cause of drug induced liver injury and has a boxed warning and restricted availability because of severe hypersensitivity reactions including acute liver failure and aplastic anemia. Thus, clinically significant liver injury from cenobamate may occur and can be severe, but is rare.Likelihood score: D (possible rare cause of clinically apparent liver injury in the context of generalized hypersensitivity syndrome). Mtx: she started that and on it back last 8 months and that was newer and for rheumatism. Perlivertox Methotrexate is well known to cause serum aminotransferase elevations and long term therapy has been linked to development of fatty liver disease, fibrosis and even cirrhosis. The literature on methotrexate is extensive, but with great variability in rates of liver test and biopsy abnormalities at different doses, dose regimens and durations of therapy.With high dose intravenous methotrexate, serum ALT levels can rise to 10 to 20 times the upper limit of normal (ULN) within 12 to 48 hours, but levels then fall rapidly to normal with only rare instances of jaundice or symptoms of liver injury. With long term, low-to-moderate dose methotrexate therapy, elevations in serum ALT or AST values occur in 15% to 50% of patients, but are usually mild and self-limiting. Approximately 5% of patients have elevations greater than twice normal and these abnormalities resolve rapidly with discontinuation or dose modification, but can resolve even with continuation at the same dose level. The reported rate of ALT elevations during therapy has varied considerably, perhaps because of differences in frequency of determinations (every month vs every 3, 6 or 12 months) and due to the timing of the blood sampling (whether just before or soon after the once weekly dose). Finally, coadministration of folic acid has been shown to decrease the frequency of serum enzyme elevations and now is commonly used.Long term therapy with methotrexate has been associated with development of fatty liver and hepatic fibrosis and, in rare instances, portal hypertension and symptomatic cirrhosis. Symptoms are usually absent until cirrhosis is present, and liver tests are typically normal or minimally and transiently elevated. Routine monitoring of patients with regular liver biopsies done at 1 to 2 year intervals or with cumulative methotrexate doses of 1 to 10 grams demonstrates that approximately 30% of patients develop mild-to-moderate histological abnormalities (fat, cellular unrest, mild inflammation, nuclear atypical) and 2 to 20% of patients develop some degree of hepatic fibrosis. Well documented cases of cirrhosis arising during long term methotrexate therapy have been reported, but cirrhosis is rare in prospective series, even with routine histological monitoring. Patients who develop fibrosis on long term methotrexate therapy often have other risk factors for fatty liver disease, including excessive alcohol use, obesity, diabetes and concurrent administration of other potentially hepatotoxic agents. Use of high doses and daily methotrexate dosing is particularly associated with development of hepatic fibrosis and rates of cirrhosis of greater than 20% after 5 to 10 years of treatment. With more modern dose regimens (5 to 15 mg in one dose weekly with folate supplementation), fibrosis and clinically apparent liver disease are rare even with long term use. The hepatic fibrosis and cirrhosis due to methotrexate typically arise after 2 to 10 years of treatment and can present with ascites, variceal hemorrhage or hepatosplenomegaly. Some patients present with signs and symptoms of portal hypertension, yet have only moderate degrees of fibrosis, suggesting that methotrexate may also cause nodular regeneration. Patients who develop portal hypertension and cirrhosis usually have had minimal or no elevations in serum aminotransferase or alkaline phosphatase levels, and monitoring using serum enzymes appears to be poorly predictive of fibrosis development. Noninvasive markers of hepatic fibrosis, such as serial platelet counts, serum procollagen III aminoterminal peptide (PIIIP), serum bile acids, hepatic ultrasound, advanced imaging techniques and transient elastography may be more efficient in screening for fibrosis in patients on long term methotrexate, but the reliability and accuracy of these approaches has not been documented prospectively. Patients with cirrhosis due to methotrexate are often asymptomatic and the condition tends to be non-progressive, even in those who restart low dose therapy. Rare instances of hepatocellular carcinoma have been reported in patients with suspected methotrexate induced cirrhosis.Low dose, long term methotrexate therapy has also been implicated in rare instances of reactivation of hepatitis B in patients with rheumatoid arthritis or psoriasis who were HBsAg carriers, without HBeAg and with normal ALT levels and no detectable or low levels of HBV DNA before starting methotrexate. The frequency of reactivation with methotrexate is unknown, but is probably low. Reactivation typically presents after years of therapy with methotrexate and most published cases were also receiving corticosteroids. The clinical presentation is characterized by insidious onset of fatigue, nausea and jaundice accompanied by marked elevations in serum ALT and HBV DNA levels. In some instances, the acute injury is severe and progressive resulting in liver failure. In many case reports, reactivation occurred when methotrexate was withdrawn, perhaps as a result of restoration of immune reactivity in those in whom HBV DNA levels have risen during treatment. Reactivation has also been described in patients with antibodies to HBV without HBsAg (reverse seroconversion) treated with methotrexate and prednisone. The cases of reactivation of hepatitis B published in the literature have mostly resulted in death or emergency liver transplantation, perhaps reflecting publication bias for more severe cases. These cases have led to recommendations for routine screening for HBsAg before starting long term methotrexate therapy and prophylaxis with antiviral agents or careful monitoring for rises in HBV DNA levels if methotrexate is used. However, whether methotrexate on its own, without prednisone, can cause reactivation of hepatitis B is not clear.Likelihood score: A (well known cause of chronic, clinically significant liver injury, portal hypertension and cirrhosis). She should let the rheumatologist know re the labs being up as the mtx and lamictal are the two most likely to do this. [place] 2020 labs showed WBC 12.6, hemoglobin 13.1 platelet count 281 MCV 96.2 neutrophils 8.6 elevated lymphocytes 3.0 glucose 93 BUN 25 creatinine 1.16 sodium 137 potassium 4.4 calcium 8.4 AST 18 ALT 18 alk phos 91 bilirubin 0.3 AGA ultrasound 2020 showed liver echogenicity increased suggesting fatty infiltration in the well-circumscribed hypoechoic lesion in the right lobe measuring 1.9 x 2.6 x 2 cm and approximately stable versus 2019 and allowing for slight differences in technique. No gallstones seen. Common bile duct 2 mm. Partially visualized pancreas unremarkable. Right kidney 10.2 cm with no hydronephrosis. They recommended further characterization with MRI if not previously performed. CT stone protocol done in 2020 showing stable 2.8 cm hypodense lesion posterior right lobe of the liver likely benign. Gallbladder biliary tree normal and spleen normal. Appendix not well-seen however no right lower quadrant inflammatory changes seen. Pancreas normal as were adrenal glands and kidneys. No aggressive osseous lesion but likely stable hemangioma at the lumbar 1 vertebral body also seen. No renal stones. Plan: 1. Pt will do mri in july. 2. She is seeking out new epilepsy. She is to do eeg and assess this. 3. Pt will see rheum re other than mtx issues. 4. She will activate the portal today. 5. She will see us in july post mri. Duration of visit was minutes with 10 minutes spent prepping chart and loading info for the visit to [facility] and then additional 25 minutes spent for this face to face visit reviewing chart and events and plans with the pt.

## 2024-08-14 ENCOUNTER — OFFICE VISIT (OUTPATIENT)
Dept: URBAN - METROPOLITAN AREA CLINIC 86 | Facility: CLINIC | Age: 59
End: 2024-08-14
Payer: COMMERCIAL

## 2024-08-14 ENCOUNTER — DASHBOARD ENCOUNTERS (OUTPATIENT)
Age: 59
End: 2024-08-14

## 2024-08-14 VITALS
HEART RATE: 78 BPM | DIASTOLIC BLOOD PRESSURE: 72 MMHG | HEIGHT: 64 IN | WEIGHT: 207 LBS | TEMPERATURE: 97.1 F | SYSTOLIC BLOOD PRESSURE: 103 MMHG | BODY MASS INDEX: 35.34 KG/M2

## 2024-08-14 DIAGNOSIS — K76.0 HEPATIC STEATOSIS: ICD-10-CM

## 2024-08-14 DIAGNOSIS — D18.03 LIVER HEMANGIOMA: ICD-10-CM

## 2024-08-14 DIAGNOSIS — K76.9 LIVER LESION: ICD-10-CM

## 2024-08-14 DIAGNOSIS — R93.5 ABNORMAL ULTRASOUND OF ABDOMEN: ICD-10-CM

## 2024-08-14 PROCEDURE — 99214 OFFICE O/P EST MOD 30 MIN: CPT | Performed by: PHYSICIAN ASSISTANT

## 2024-08-14 RX ORDER — CENOBAMATE 50 MG/1
1 TABLET TABLET, FILM COATED ORAL ONCE A DAY
Status: ACTIVE | COMMUNITY

## 2024-08-14 RX ORDER — FLUTICASONE PROPIONATE 50 UG/1
1 SPRAY IN EACH NOSTRIL SPRAY, METERED NASAL ONCE A DAY
Status: ACTIVE | COMMUNITY

## 2024-08-14 RX ORDER — OMEPRAZOLE 40 MG/1
1 CAPSULE 30 MINUTES BEFORE MORNING MEAL CAPSULE, DELAYED RELEASE ORAL ONCE A DAY
Qty: 90 | Refills: 0 | Status: ON HOLD | COMMUNITY
Start: 2024-02-22

## 2024-08-14 RX ORDER — TOPIRAMATE 100 MG/1
1 IN AND 2 IN PM TABLET, FILM COATED ORAL TWICE A DAY
Status: ACTIVE | COMMUNITY

## 2024-08-14 RX ORDER — HYDROXYCHLOROQUINE SULFATE 200 MG/1
AS DIRECTED TABLET, FILM COATED ORAL
Status: ACTIVE | COMMUNITY

## 2024-08-14 RX ORDER — SUMATRIPTAN SUCCINATE 100 MG/1
1 TABLET AT LEAST 2 HOURS BETWEEN DOSES AS NEEDED TABLET, FILM COATED ORAL TWICE A DAY
Status: ACTIVE | COMMUNITY

## 2024-08-14 RX ORDER — FOLIC ACID 1 MG/1
1 TABLET TABLET ORAL ONCE A DAY
Status: ACTIVE | COMMUNITY

## 2024-08-14 RX ORDER — DICYCLOMINE HYDROCHLORIDE 10 MG/1
2 CAPSULES CAPSULE ORAL THREE TIMES A DAY
Status: ACTIVE | COMMUNITY

## 2024-08-14 RX ORDER — PLECANATIDE 3 MG/1
1 TABLET TABLET ORAL ONCE A DAY
Qty: 90 | Refills: 3 | Status: ACTIVE | COMMUNITY

## 2024-08-14 RX ORDER — LANSOPRAZOLE 30 MG/1
1 CAPSULE BEFORE A MEAL CAPSULE, DELAYED RELEASE ORAL TWICE A DAY
Qty: 180 CAPSULE | Refills: 3 | Status: ACTIVE | COMMUNITY

## 2024-08-14 RX ORDER — ATORVASTATIN CALCIUM 10 MG/1
1 TABLET TABLET, FILM COATED ORAL ONCE A DAY
Status: ACTIVE | COMMUNITY

## 2024-08-14 RX ORDER — LAMOTRIGINE 100 MG/1
1 TABLET TABLET, ORALLY DISINTEGRATING ORAL TWICE A DAY
Status: ACTIVE | COMMUNITY

## 2024-08-14 RX ORDER — PROMETHAZINE HYDROCHLORIDE 25 MG/1
TABLET ORAL
Qty: 0 | Refills: 0 | Status: ACTIVE | COMMUNITY
Start: 1900-01-01

## 2024-08-14 NOTE — HPI-TODAY'S VISIT:
Pt is listed as being a 59-year-old female who was previously seen 2024 by us and also sees Dr. Fernandez in our group and sees DR Fleming for primary care and  came in to be seen for liver lesions.  A copy of the note will be sent to the referring provider.  24 ov  May 19 MRI was done with no contrast so am glad done that way given our discussion of the other day.  Lower chest views shows lower thorax normal.  Liver with normal morphology and no significant fat or iron deposition within the liver fat fraction 2.3%.  Stable hemangioma was seen in segment 6 of the liver measuring 3.0 x 2.4 cm versus January. Some punctate hepatic cysts were seen.  Gallbladder/biliary, spleen, adrenal glands, kidneys, gastrointestinal views, lymph nodes, and vessels as well as peritoneum were all normal which is good to know. In the pancreas he did have mild pancreatic parenchymal atrophy that was seen.  Bone and soft tissue views showed unchanged intraosseous hemangiomas within the thoracolumbar spine.  Please share with primary provider to be aware of.  In summary, stable hepatic segment 6 hemangioma seen with normal liver morphology and without significant liver fat.  I do not at this poing see a reason to redo MRI  with contrast at this point so this report was sufficient for what we wanted to check on.  We will attempt next scan to be u.s to follow the area over time.  Dr. Wilburn   need to get labs today.  pending eeg to check on her seizures.  Asked about the rheumatologist and she is now taking plaquenil but notes not taking regularly due to the increase in her epilepsy medication  recap She has  not had another scan noted since dec.  She did egd and was neg for celiac and trying to see.   labs showed WBC 6.4 hemoglobin 12.1 platelet count 265 MCV 98.7 sodium 144 potassium 4.0 chloride 114 CO2 22 BUN of 22 creatinine 1.05 glucose 58 bilirubin 0.3 alk phos 102 AST 27 ALT 56.   celiac panel noted to be negative.   labs show glucose 72 BUN 23 creatinine 1.08 elevated sodium 143 potassium 4.1 chloride 112 CO2 24 albumin 4.1 bilirubin 0.3 alk phos 92 AST of 16 ALT of 23 WBC 6.5 hemoglobin 12.6 plate count 249.  24 ov Genna   labs show IgG normal at 997 and IgM normal at 78 which would suggest that the immune system is not at an abnormal level at this timeframe.  You have no immunity seen to hepatitis B surface antibody and need to consider elective vaccination for same.   Smooth muscle antibody was negative as well as AMA.  Sugar was normal at 85 BUN was normal at 19 with creatinine up to 1.12. Please share with primary provider.  Sodium 142 potassium 4.3 normal. Chloride a little up at 111. CO2 of 24 with a calcium 9.2 and albumin 4.3 bilirubin 0.3 alk phos 92 and an AST of 18.  ALT was 31 which was a little up. Ideal ALT is less than 25.  Your SOFIA screen was positive but again as you saw immunoglobulins were not increased suggesting that while the SOFIA is positive it does not appear to be driving issues.  WBC was 6.7 hemoglobin 12.7 plate count 265 MCV 97 with normal neutrophils and lymphocytes.  Hep B core total negative indicating no prior exposure to hep B and hep B surface antigen negative indicating no active hep B.  You are immune to hepatitis A however so that is good to know. Also your hep C antibody was negative which is also good to note.  She says that was on an arthritis med and not on it now.  Sees Rheum soon.  For comparison, 2023 labs showed AST of 89 and ALT of 47 so curiously labs are lower.  Certainly curious would be if the methotrexate dose timing and the drop of the labs correlated that would be important to note because shortly after the methotrexate dosing you would expect the labs to be higher and just prior to the next methotrexate dosing you would expect the labs to be lower.  Please share these findings with your primary providers.  She will look into other than MTX options for this.  aniket  Has epiliepsy and she sees [facility] and she Nov/Dec  2023. That raised the concern re the liver.  Notes reviewed brenda and prince:   Patient listed as having a past medical history of appendectomy, hysterectomy, duodenitis, hepatic hemangioma, and IBS.  In the past has been treated with dicyclomine and Phenergan by her primary GI.  Patient also mentions having seizure disorder and cavernous malformation history. Followed by [facility] for that and last seizure recent and Tuesday of this week. She has them often and mainly at night.  Lumbar radiculopathy and cervical disc disease as well as hiatal hernia.  Other history included carpal tunnel, endometriosis, family history of breast cancer, fibrocystic breast disease, GERD, migraine, plantar fasciitis.  Other surgeries include brain surgery, , left knee surgery, colonoscopy and upper endoscopy. Endometrial ablation, hysterectomy, shoulder surgery, total abdominal hysterectomy and bilateral salpingo-oophorectomy. Foot surgery.  Family history of sister with breast cancer and leukemia and she passed. Mother with history of heart disease cervical cancer hypertension arthritis.  Social history not a smoker not a drinker.  Medicines listed included albuterol 90 mcg inhaler as needed, dicyclomine 10 mg as needed, Flonase 50 mcg as needed. Lamictal 100 mg twice a day, Trileptal 300 mg twice a day, pantoprazole 40 mL a day, Trulance 3 mg a day, Phenergan 25 mg as needed.  Allergies to aspirin, Cipro, Keppra which cause itching, and IV iodine contrast which caused a rash. She previously had a CT contrast rash reaction responded to Benadryl.  Weight when last seen at [place] had been 101.2 kg with a BMI of 38.28. She says 213.  Labs then showed RBC count 3.73, neutrophils 4.9, creatinine 1.05 chloride 111 bilirubin 0.2.  Mri done 1.5 weeks ago.  Dec 2023: ct:  FINDINGS: Normal heart size with trace pericardial effusion. Lung bases are clear.  Indeterminant somewhat ill-defined hypodense subcapsular lesion in the right hepatic lobe measuring 2.8 x 2.5 cm (series 2 image 23). Gallbladder and biliary tree are normal. Spleen is unremarkable. Mild fatty atrophy of the pancreas without ductal dilation or inflammation. No adrenal nodule. Kidneys are symmetric in size without contour deforming lesion, hydronephrosis or urolithiasis.  Air-fluid level in the distal esophagus may suggest reflux. Stomach is mostly decompressed. No bowel obstruction. The appendix is not clearly identified (she does not have), system put reported clinical history of prior appendectomy. Mild colonic fecal loading. No free fluid or free air.  Normal caliber abdominal aorta. No abdominal adenopathy.  Urinary bladder is normal. Uterus is absent. No suspicious adnexal lesion, free pelvic fluid or enlarged pelvic adenopathy.  Small trabeculated lucency in the posterior T9 vertebral body favors an osseous hemangioma. No acute fracture or aggressive osseous lesion.  IMPRESSION:1. Indeterminant somewhat ill-defined hypodense subcapsular lesion in the right hepatic lobe measuring 2.8 x 2.5 cm. Benign and malignant etiologies are considered possible for this lesion, which should be completely characterized with a short interval nonemergent abdominal MRI with contrast. 2. No additional acute findings in the abdomen or pelvis to suggest etiology for flank pain. Specifically, no urolithiasis. Appendix is not seen, consistent with reported history of appendectomy.3. Mild colonic fecal loading. Air-fluid level in the distal esophagus may suggest reflux  Mri 2024:  FINDINGS: Lower Thorax: Normal.  Liver: No fat or iron. Segment 6 2.9 x 2.4 cm (5:21) T2 hyperintense T1 isointense lesion with progressive discontinuous nodular peripheral enhancement consistent with hemangioma.    Gallbladder/Biliary Tree: Normal.  Spleen: Normal.  Pancreas: Normal.  Adrenal Glands: Normal.   Kidneys/Ureters: Normal.  Gastrointestinal: Normal.   Lymph Nodes: Normal  Vessels: Normal.  Peritoneum/Retroperitoneum: Normal.  Bones/Soft Tissues: Degenerative changes of spine without aggressive osseous lesion.  IMPRESSION:Segment 6 hepatic hemangioma. No concerning hepatic lesions.  The images were reviewed and interpreted by Carmen Booth MD.  CT scan in 2022 ER visit showed no acute malady in the abdomen or pelvis but there was a 2.1 cm hypodensity in the posterior right lobe which was felt unchanged versus 2020 while incomplete characterize could be hemangioma.  CT head at the time showed a subcentimeter hypodensity in the left medial temporal lobe that was seen in 2020 that appeared to be slightly decreased in size and may have been suspicious for an underlying cavernous transformation area. No gray-white loss seen and no hydrocephalus.  Patient had EGD 2020 that showed no gross lesions and few diminutive sessile polyps with no stigmata of recent bleeding in the stomach with a polyp removed with cold forceps. Grade B esophagitis was seen and 2 cm hiatal hernia. Path report showed fundic gland polyp and no H. pylori.  She did egd in 2024.  Dec 27 2023L na 141 and k 3.8 amd cl 111 and co2 23 and bun 17 and cr 1,06 and glu 83 and alb 4.3 and alk 91 and ast 89 and alt 47 and 2023 alk 96 and ast 25 and alt 33.   They in the lamictal in between  and dec, She remains on the higher dose.  Lamictal livertox:  Prospective studies suggest that less than 1% of subjects develop elevations in serum aminotransferase levels during long term lamotrigine therapy. However, clinically apparent hepatotoxicity from lamotrigine is well known and is estimated to occur in one in 2,000 to 10,000 treated patients. The liver injury is usually part of a systemic immuno-allergic reaction (anticonvulsant hypersensitivity syndrome [AHS] or drug rash with eosinophilia and systemic symptoms [DRESS] syndrome). The latency is typically short, ranging from one to several weeks. Presenting symptoms are a diffuse maculopapular rash, followed in a few days by high fever, nausea and vomiting. The rash can develop into a systemic hypersensitivity reaction and multiorgan failure or be associated with jaundice and hepatitis. Eosinophilia is common, and facial edema, lymphadenopathy and atypical lymphocytosis can occur. The pattern of liver enzyme elevations is usually hepatocellular and severity ranges from mild-to-moderate ALT elevations accompanying the generalized hypersensitivity reaction, to an icteric hepatitis, to a severe hepatitis and acute liver failure. Liver biopsy shows portal inflammation, hepatocellular necrosis and bile duct proliferation. In some instances of severe hypersensitivity syndrome with acute multiorgan failure, the hepatic involvement may represent ischemic injury.Lamotrigine has also been linked to rare instances of hemophagocytic lymphohistiocytosis, a rare and severe immune related reaction characterized by unremitting activation of CD8+ T cells and macrophages that causes multiorgan damage including liver injury, hepatitis and liver failure. Cases of HLH linked to lamotrigine arose within 1 to 4 weeks of starting the drug, usually in infants or children, marked clinically by fever, rash, cytopenias, hepatitis, high triglycerides and ferritin levels and bone marrow or liver histology demonstrating hemophagocytosisLikelihood score: A (well known cause of clinically apparent liver injury).  Xcopri she started it near end of 2023.  Cenobamate:  In controlled clinical trials, addition of cenobamate to standard anticonvulsant therapy was associated with transient, mild-to-moderate serum aminotransferase elevations in 1% to 4% of patients. In the preregistration trials of cenobamate, there were no cases of clinically apparent liver injury with jaundice. However, among 953 patients with exposure to cenobamate in these trials, there were three cases of DRESS syndrome accompanied by serum aminotransferase elevations, which arose during the first 3 to 6 weeks of treatment in subjects who had a relative rapid initial titration (4 to 6 weeks). In large open label studies using a more prolonged titration period (12 weeks), no instances of DRESS syndrome were reported among more than 1000 participants. Felbamate, a structurally related carbamate anticonvulsant, is a well-known cause of drug induced liver injury and has a boxed warning and restricted availability because of severe hypersensitivity reactions including acute liver failure and aplastic anemia. Thus, clinically significant liver injury from cenobamate may occur and can be severe, but is rare.Likelihood score: D (possible rare cause of clinically apparent liver injury in the context of generalized hypersensitivity syndrome).  Mtx: she started that and on it back last 8 months and that was newer and for rheumatism.  Perlivertox  Methotrexate is well known to cause serum aminotransferase elevations and long term therapy has been linked to development of fatty liver disease, fibrosis and even cirrhosis. The literature on methotrexate is extensive, but with great variability in rates of liver test and biopsy abnormalities at different doses, dose regimens and durations of therapy.With high dose intravenous methotrexate, serum ALT levels can rise to 10 to 20 times the upper limit of normal (ULN) within 12 to 48 hours, but levels then fall rapidly to normal with only rare instances of jaundice or symptoms of liver injury. With long term, low-to-moderate dose methotrexate therapy, elevations in serum ALT or AST values occur in 15% to 50% of patients, but are usually mild and self-limiting. Approximately 5% of patients have elevations greater than twice normal and these abnormalities resolve rapidly with discontinuation or dose modification, but can resolve even with continuation at the same dose level. The reported rate of ALT elevations during therapy has varied considerably, perhaps because of differences in frequency of determinations (every month vs every 3, 6 or 12 months) and due to the timing of the blood sampling (whether just before or soon after the once weekly dose). Finally, coadministration of folic acid has been shown to decrease the frequency of serum enzyme elevations and now is commonly used.Long term therapy with methotrexate has been associated with development of fatty liver and hepatic fibrosis and, in rare instances, portal hypertension and symptomatic cirrhosis. Symptoms are usually absent until cirrhosis is present, and liver tests are typically normal or minimally and transiently elevated. Routine monitoring of patients with regular liver biopsies done at 1 to 2 year intervals or with cumulative methotrexate doses of 1 to 10 grams demonstrates that approximately 30% of patients develop mild-to-moderate histological abnormalities (fat, cellular unrest, mild inflammation, nuclear atypical) and 2 to 20% of patients develop some degree of hepatic fibrosis. Well documented cases of cirrhosis arising during long term methotrexate therapy have been reported, but cirrhosis is rare in prospective series, even with routine histological monitoring. Patients who develop fibrosis on long term methotrexate therapy often have other risk factors for fatty liver disease, including excessive alcohol use, obesity, diabetes and concurrent administration of other potentially hepatotoxic agents. Use of high doses and daily methotrexate dosing is particularly associated with development of hepatic fibrosis and rates of cirrhosis of greater than 20% after 5 to 10 years of treatment. With more modern dose regimens (5 to 15 mg in one dose weekly with folate supplementation), fibrosis and clinically apparent liver disease are rare even with long term use. The hepatic fibrosis and cirrhosis due to methotrexate typically arise after 2 to 10 years of treatment and can present with ascites, variceal hemorrhage or hepatosplenomegaly. Some patients present with signs and symptoms of portal hypertension, yet have only moderate degrees of fibrosis, suggesting that methotrexate may also cause nodular regeneration. Patients who develop portal hypertension and cirrhosis usually have had minimal or no elevations in serum aminotransferase or alkaline phosphatase levels, and monitoring using serum enzymes appears to be poorly predictive of fibrosis development. Noninvasive markers of hepatic fibrosis, such as serial platelet counts, serum procollagen III aminoterminal peptide (PIIIP), serum bile acids, hepatic ultrasound, advanced imaging techniques and transient elastography may be more efficient in screening for fibrosis in patients on long term methotrexate, but the reliability and accuracy of these approaches has not been documented prospectively. Patients with cirrhosis due to methotrexate are often asymptomatic and the condition tends to be non-progressive, even in those who restart low dose therapy. Rare instances of hepatocellular carcinoma have been reported in patients with suspected methotrexate induced cirrhosis.Low dose, long term methotrexate therapy has also been implicated in rare instances of reactivation of hepatitis B in patients with rheumatoid arthritis or psoriasis who were HBsAg carriers, without HBeAg and with normal ALT levels and no detectable or low levels of HBV DNA before starting methotrexate. The frequency of reactivation with methotrexate is unknown, but is probably low. Reactivation typically presents after years of therapy with methotrexate and most published cases were also receiving corticosteroids. The clinical presentation is characterized by insidious onset of fatigue, nausea and jaundice accompanied by marked elevations in serum ALT and HBV DNA levels. In some instances, the acute injury is severe and progressive resulting in liver failure. In many case reports, reactivation occurred when methotrexate was withdrawn, perhaps as a result of restoration of immune reactivity in those in whom HBV DNA levels have risen during treatment. Reactivation has also been described in patients with antibodies to HBV without HBsAg (reverse seroconversion) treated with methotrexate and prednisone. The cases of reactivation of hepatitis B published in the literature have mostly resulted in death or emergency liver transplantation, perhaps reflecting publication bias for more severe cases. These cases have led to recommendations for routine screening for HBsAg before starting long term methotrexate therapy and prophylaxis with antiviral agents or careful monitoring for rises in HBV DNA levels if methotrexate is used. However, whether methotrexate on its own, without prednisone, can cause reactivation of hepatitis B is not clear.Likelihood score: A (well known cause of chronic, clinically significant liver injury, portal hypertension and cirrhosis).  She should let the rheumatologist know re the labs being up as the mtx and lamictal are the two most likely to do this.  [place] 2020 labs showed WBC 12.6, hemoglobin 13.1 platelet count 281 MCV 96.2 neutrophils 8.6 elevated lymphocytes 3.0 glucose 93 BUN 25 creatinine 1.16 sodium 137 potassium 4.4 calcium 8.4 AST 18 ALT 18 alk phos 91 bilirubin 0.3  [facility] ultrasound 2020 showed liver echogenicity increased suggesting fatty infiltration in the well-circumscribed hypoechoic lesion in the right lobe measuring 1.9 x 2.6 x 2 cm and approximately stable versus 2019 and allowing for slight differences in technique. No gallstones seen. Common bile duct 2 mm. Partially visualized pancreas unremarkable. Right kidney 10.2 cm with no hydronephrosis. They recommended further characterization with MRI if not previously performed.  CT stone protocol done in 2020 showing stable 2.8 cm hypodense lesion posterior right lobe of the liver likely benign. Gallbladder biliary tree normal and spleen normal. Appendix not well-seen however no right lower quadrant inflammatory changes seen. Pancreas normal as were adrenal glands and kidneys. No aggressive osseous lesion but likely stable hemangioma at the lumbar 1 vertebral body also seen. No renal stones.

## 2024-08-15 ENCOUNTER — TELEPHONE ENCOUNTER (OUTPATIENT)
Dept: URBAN - METROPOLITAN AREA CLINIC 86 | Facility: CLINIC | Age: 59
End: 2024-08-15

## 2024-08-15 LAB
ALBUMIN/GLOBULIN RATIO: 1.8
ALBUMIN: 4.1
ALKALINE PHOSPHATASE: 95
ALT (SGPT): 32
AST (SGOT): 19
BILIRUBIN, DIRECT: 0
BILIRUBIN, INDIRECT: 0.3
BILIRUBIN, TOTAL: 0.3
GLOBULIN: 2.3
PROTEIN, TOTAL: 6.4

## 2024-08-15 NOTE — HPI-TODAY'S VISIT:
Dear Hoa Farah,    The 8/14/24/ labs were sent to me.  The bilirubin 0.3, AST 19, ALT 32.  Goal for the AST and ALT is less than 25. Previosuly the ast 27 and alt 56 an this is lower and as you continue to loose weight this will improve. We will check the ultrasound in november and see you in december.    Genna Kwong PA-C

## 2024-10-07 ENCOUNTER — OFFICE VISIT (OUTPATIENT)
Dept: URBAN - METROPOLITAN AREA CLINIC 17 | Facility: CLINIC | Age: 59
End: 2024-10-07

## 2024-11-04 ENCOUNTER — LAB OUTSIDE AN ENCOUNTER (OUTPATIENT)
Dept: URBAN - METROPOLITAN AREA CLINIC 86 | Facility: CLINIC | Age: 59
End: 2024-11-04

## 2024-12-02 ENCOUNTER — OFFICE VISIT (OUTPATIENT)
Dept: URBAN - METROPOLITAN AREA CLINIC 17 | Facility: CLINIC | Age: 59
End: 2024-12-02

## 2024-12-02 ENCOUNTER — LAB OUTSIDE AN ENCOUNTER (OUTPATIENT)
Dept: URBAN - METROPOLITAN AREA CLINIC 86 | Facility: CLINIC | Age: 59
End: 2024-12-02

## 2024-12-17 ENCOUNTER — LAB OUTSIDE AN ENCOUNTER (OUTPATIENT)
Dept: URBAN - METROPOLITAN AREA CLINIC 86 | Facility: CLINIC | Age: 59
End: 2024-12-17

## 2024-12-17 ENCOUNTER — OFFICE VISIT (OUTPATIENT)
Dept: URBAN - METROPOLITAN AREA CLINIC 86 | Facility: CLINIC | Age: 59
End: 2024-12-17
Payer: COMMERCIAL

## 2024-12-17 VITALS
RESPIRATION RATE: 98 BRPM | TEMPERATURE: 97.2 F | BODY MASS INDEX: 34.66 KG/M2 | DIASTOLIC BLOOD PRESSURE: 84 MMHG | SYSTOLIC BLOOD PRESSURE: 139 MMHG | HEIGHT: 64 IN | HEART RATE: 68 BPM | WEIGHT: 203 LBS

## 2024-12-17 DIAGNOSIS — R10.13 DYSPEPSIA: ICD-10-CM

## 2024-12-17 DIAGNOSIS — K58.9 IRRITABLE BOWEL SYNDROME (IBS): ICD-10-CM

## 2024-12-17 DIAGNOSIS — K76.9 LIVER LESION: ICD-10-CM

## 2024-12-17 DIAGNOSIS — G40.909 SEIZURE DISORDER: ICD-10-CM

## 2024-12-17 DIAGNOSIS — Z71.85 VACCINE COUNSELING: ICD-10-CM

## 2024-12-17 DIAGNOSIS — E66.811 CLASS 1 OBESITY: ICD-10-CM

## 2024-12-17 DIAGNOSIS — D18.03 LIVER HEMANGIOMA: ICD-10-CM

## 2024-12-17 DIAGNOSIS — K76.0 HEPATIC STEATOSIS: ICD-10-CM

## 2024-12-17 DIAGNOSIS — R93.5 ABNORMAL ULTRASOUND OF ABDOMEN: ICD-10-CM

## 2024-12-17 DIAGNOSIS — Z79.899 HIGH RISK MEDICATION USE: ICD-10-CM

## 2024-12-17 PROCEDURE — 99214 OFFICE O/P EST MOD 30 MIN: CPT | Performed by: PHYSICIAN ASSISTANT

## 2024-12-17 RX ORDER — DICYCLOMINE HYDROCHLORIDE 10 MG/1
2 CAPSULES CAPSULE ORAL THREE TIMES A DAY
Status: ACTIVE | COMMUNITY

## 2024-12-17 RX ORDER — SUMATRIPTAN SUCCINATE 100 MG/1
1 TABLET AT LEAST 2 HOURS BETWEEN DOSES AS NEEDED TABLET, FILM COATED ORAL TWICE A DAY
Status: ACTIVE | COMMUNITY

## 2024-12-17 RX ORDER — LAMOTRIGINE 100 MG/1
1 TABLET TABLET, ORALLY DISINTEGRATING ORAL TWICE A DAY
Status: ON HOLD | COMMUNITY

## 2024-12-17 RX ORDER — OMEPRAZOLE 40 MG/1
1 CAPSULE 30 MINUTES BEFORE MORNING MEAL CAPSULE, DELAYED RELEASE ORAL ONCE A DAY
Qty: 90 | Refills: 0 | Status: ON HOLD | COMMUNITY
Start: 2024-02-22

## 2024-12-17 RX ORDER — CENOBAMATE 50 MG/1
1 TABLET TABLET, FILM COATED ORAL ONCE A DAY
Status: ACTIVE | COMMUNITY

## 2024-12-17 RX ORDER — FOLIC ACID 1 MG/1
1 TABLET TABLET ORAL ONCE A DAY
Status: ACTIVE | COMMUNITY

## 2024-12-17 RX ORDER — TOPIRAMATE 100 MG/1
1 IN AND 2 IN PM TABLET, FILM COATED ORAL TWICE A DAY
Status: ON HOLD | COMMUNITY

## 2024-12-17 RX ORDER — PLECANATIDE 3 MG/1
1 TABLET TABLET ORAL ONCE A DAY
Qty: 90 | Refills: 3 | Status: ACTIVE | COMMUNITY

## 2024-12-17 RX ORDER — HYDROXYCHLOROQUINE SULFATE 200 MG/1
AS DIRECTED TABLET, FILM COATED ORAL
Status: ACTIVE | COMMUNITY

## 2024-12-17 RX ORDER — FLUTICASONE PROPIONATE 50 UG/1
1 SPRAY IN EACH NOSTRIL SPRAY, METERED NASAL ONCE A DAY
Status: ACTIVE | COMMUNITY

## 2024-12-17 RX ORDER — GABAPENTIN 300 MG/1
1 CAPSULE CAPSULE ORAL ONCE A DAY
Status: ACTIVE | COMMUNITY

## 2024-12-17 RX ORDER — ATORVASTATIN CALCIUM 10 MG/1
1 TABLET TABLET, FILM COATED ORAL ONCE A DAY
Status: ACTIVE | COMMUNITY

## 2024-12-17 RX ORDER — PROMETHAZINE HYDROCHLORIDE 25 MG/1
TABLET ORAL
Qty: 0 | Refills: 0 | Status: ACTIVE | COMMUNITY
Start: 1900-01-01

## 2024-12-17 RX ORDER — LANSOPRAZOLE 30 MG/1
1 CAPSULE BEFORE A MEAL CAPSULE, DELAYED RELEASE ORAL TWICE A DAY
Qty: 180 CAPSULE | Refills: 3 | Status: ACTIVE | COMMUNITY

## 2024-12-17 NOTE — HPI-TODAY'S VISIT:
Pt is listed as being a 59-year-old female who was previously seen 2024 by us and also sees Dr. Fernandez in our group and sees DR Fleming for primary care and  came in to be seen for liver lesions.  A copy of the note will be sent to the referring provider.  24 The 24/ labs were sent to me. The bilirubin 0.3, AST 19, ALT 32. Goal for the AST and ALT is less than 25. Previosuly the ast 27 and alt 56 an this is lower and as you continue to loose weight this will improve. We will check the ultrasound in november and see you in december.    Genna Kwong PA-C  24 ov  May 19 MRI was done with no contrast so am glad done that way given our discussion of the other day.  Lower chest views shows lower thorax normal.  Liver with normal morphology and no significant fat or iron deposition within the liver fat fraction 2.3%.  Stable hemangioma was seen in segment 6 of the liver measuring 3.0 x 2.4 cm versus January. Some punctate hepatic cysts were seen.  Gallbladder/biliary, spleen, adrenal glands, kidneys, gastrointestinal views, lymph nodes, and vessels as well as peritoneum were all normal which is good to know. In the pancreas he did have mild pancreatic parenchymal atrophy that was seen.  Bone and soft tissue views showed unchanged intraosseous hemangiomas within the thoracolumbar spine.  Please share with primary provider to be aware of.  In summary, stable hepatic segment 6 hemangioma seen with normal liver morphology and without significant liver fat.  I do not at this poing see a reason to redo MRI  with contrast at this point so this report was sufficient for what we wanted to check on.  We will attempt next scan to be u.s to follow the area over time.  Dr. Wilburn   need to get labs today.  pending eeg to check on her seizures.  Asked about the rheumatologist and she is now taking plaquenil but notes not taking regularly due to the increase in her epilepsy medication  recap She has  not had another scan noted since dec.  She did egd and was neg for celiac and trying to see.   labs showed WBC 6.4 hemoglobin 12.1 platelet count 265 MCV 98.7 sodium 144 potassium 4.0 chloride 114 CO2 22 BUN of 22 creatinine 1.05 glucose 58 bilirubin 0.3 alk phos 102 AST 27 ALT 56.   celiac panel noted to be negative.   labs show glucose 72 BUN 23 creatinine 1.08 elevated sodium 143 potassium 4.1 chloride 112 CO2 24 albumin 4.1 bilirubin 0.3 alk phos 92 AST of 16 ALT of 23 WBC 6.5 hemoglobin 12.6 plate count 249.  2/ chhaya [place]   labs show IgG normal at 997 and IgM normal at 78 which would suggest that the immune system is not at an abnormal level at this timeframe.  You have no immunity seen to hepatitis B surface antibody and need to consider elective vaccination for same.   Smooth muscle antibody was negative as well as AMA.  Sugar was normal at 85 BUN was normal at 19 with creatinine up to 1.12. Please share with primary provider.  Sodium 142 potassium 4.3 normal. Chloride a little up at 111. CO2 of 24 with a calcium 9.2 and albumin 4.3 bilirubin 0.3 alk phos 92 and an AST of 18.  ALT was 31 which was a little up. Ideal ALT is less than 25.  Your SOFIA screen was positive but again as you saw immunoglobulins were not increased suggesting that while the SOFIA is positive it does not appear to be driving issues.  WBC was 6.7 hemoglobin 12.7 plate count 265 MCV 97 with normal neutrophils and lymphocytes.  Hep B core total negative indicating no prior exposure to hep B and hep B surface antigen negative indicating no active hep B.  You are immune to hepatitis A however so that is good to know. Also your hep C antibody was negative which is also good to note.  She says that was on an arthritis med and not on it now.  Sees Rheum soon.  For comparison, 2023 labs showed AST of 89 and ALT of 47 so curiously labs are lower.  Certainly curious would be if the methotrexate dose timing and the drop of the labs correlated that would be important to note because shortly after the methotrexate dosing you would expect the labs to be higher and just prior to the next methotrexate dosing you would expect the labs to be lower.  Please share these findings with your primary providers.  She will look into other than MTX options for this.  aniket  Has epiliepsy and she sees [facility] and she Nov/Dec  2023. That raised the concern re the liver.  Notes reviewed brenda and prince:   Patient listed as having a past medical history of appendectomy, hysterectomy, duodenitis, hepatic hemangioma, and IBS.  In the past has been treated with dicyclomine and Phenergan by her primary GI.  Patient also mentions having seizure disorder and cavernous malformation history. Followed by [facility] for that and last seizure recent and Tuesday of this week. She has them often and mainly at night.  Lumbar radiculopathy and cervical disc disease as well as hiatal hernia.  Other history included carpal tunnel, endometriosis, family history of breast cancer, fibrocystic breast disease, GERD, migraine, plantar fasciitis.  Other surgeries include brain surgery, , left knee surgery, colonoscopy and upper endoscopy. Endometrial ablation, hysterectomy, shoulder surgery, total abdominal hysterectomy and bilateral salpingo-oophorectomy. Foot surgery.  Family history of sister with breast cancer and leukemia and she passed. Mother with history of heart disease cervical cancer hypertension arthritis.  Social history not a smoker not a drinker.  Medicines listed included albuterol 90 mcg inhaler as needed, dicyclomine 10 mg as needed, Flonase 50 mcg as needed. Lamictal 100 mg twice a day, Trileptal 300 mg twice a day, pantoprazole 40 mL a day, Trulance 3 mg a day, Phenergan 25 mg as needed.  Allergies to aspirin, Cipro, Keppra which cause itching, and IV iodine contrast which caused a rash. She previously had a CT contrast rash reaction responded to Benadryl.  Weight when last seen at [facility] had been 101.2 kg with a BMI of 38.28. She says 213.  Labs then showed RBC count 3.73, neutrophils 4.9, creatinine 1.05 chloride 111 bilirubin 0.2.  Mri done 1.5 weeks ago.  Dec 2023: ct:  FINDINGS: Normal heart size with trace pericardial effusion. Lung bases are clear.  Indeterminant somewhat ill-defined hypodense subcapsular lesion in the right hepatic lobe measuring 2.8 x 2.5 cm (series 2 image 23). Gallbladder and biliary tree are normal. Spleen is unremarkable. Mild fatty atrophy of the pancreas without ductal dilation or inflammation. No adrenal nodule. Kidneys are symmetric in size without contour deforming lesion, hydronephrosis or urolithiasis.  Air-fluid level in the distal esophagus may suggest reflux. Stomach is mostly decompressed. No bowel obstruction. The appendix is not clearly identified (she does not have), system put reported clinical history of prior appendectomy. Mild colonic fecal loading. No free fluid or free air.  Normal caliber abdominal aorta. No abdominal adenopathy.  Urinary bladder is normal. Uterus is absent. No suspicious adnexal lesion, free pelvic fluid or enlarged pelvic adenopathy.  Small trabeculated lucency in the posterior T9 vertebral body favors an osseous hemangioma. No acute fracture or aggressive osseous lesion.  IMPRESSION:1. Indeterminant somewhat ill-defined hypodense subcapsular lesion in the right hepatic lobe measuring 2.8 x 2.5 cm. Benign and malignant etiologies are considered possible for this lesion, which should be completely characterized with a short interval nonemergent abdominal MRI with contrast. 2. No additional acute findings in the abdomen or pelvis to suggest etiology for flank pain. Specifically, no urolithiasis. Appendix is not seen, consistent with reported history of appendectomy.3. Mild colonic fecal loading. Air-fluid level in the distal esophagus may suggest reflux  Mri 2024:  FINDINGS: Lower Thorax: Normal.  Liver: No fat or iron. Segment 6 2.9 x 2.4 cm (5:21) T2 hyperintense T1 isointense lesion with progressive discontinuous nodular peripheral enhancement consistent with hemangioma.    Gallbladder/Biliary Tree: Normal.  Spleen: Normal.  Pancreas: Normal.  Adrenal Glands: Normal.   Kidneys/Ureters: Normal.  Gastrointestinal: Normal.   Lymph Nodes: Normal  Vessels: Normal.  Peritoneum/Retroperitoneum: Normal.  Bones/Soft Tissues: Degenerative changes of spine without aggressive osseous lesion.  IMPRESSION:Segment 6 hepatic hemangioma. No concerning hepatic lesions.  The images were reviewed and interpreted by Carmen Booth MD.  CT scan in 2022 ER visit showed no acute malady in the abdomen or pelvis but there was a 2.1 cm hypodensity in the posterior right lobe which was felt unchanged versus 2020 while incomplete characterize could be hemangioma.  CT head at the time showed a subcentimeter hypodensity in the left medial temporal lobe that was seen in 2020 that appeared to be slightly decreased in size and may have been suspicious for an underlying cavernous transformation area. No gray-white loss seen and no hydrocephalus.  Patient had EGD 2020 that showed no gross lesions and few diminutive sessile polyps with no stigmata of recent bleeding in the stomach with a polyp removed with cold forceps. Grade B esophagitis was seen and 2 cm hiatal hernia. Path report showed fundic gland polyp and no H. pylori.  She did egd in 2024.  Dec 27 2023L na 141 and k 3.8 amd cl 111 and co2 23 and bun 17 and cr 1,06 and glu 83 and alb 4.3 and alk 91 and ast 89 and alt 47 and 2023 alk 96 and ast 25 and alt 33.   They in the lamictal in between  and dec, She remains on the higher dose.  Lamictal livertox:  Prospective studies suggest that less than 1% of subjects develop elevations in serum aminotransferase levels during long term lamotrigine therapy. However, clinically apparent hepatotoxicity from lamotrigine is well known and is estimated to occur in one in 2,000 to 10,000 treated patients. The liver injury is usually part of a systemic immuno-allergic reaction (anticonvulsant hypersensitivity syndrome [AHS] or drug rash with eosinophilia and systemic symptoms [DRESS] syndrome). The latency is typically short, ranging from one to several weeks. Presenting symptoms are a diffuse maculopapular rash, followed in a few days by high fever, nausea and vomiting. The rash can develop into a systemic hypersensitivity reaction and multiorgan failure or be associated with jaundice and hepatitis. Eosinophilia is common, and facial edema, lymphadenopathy and atypical lymphocytosis can occur. The pattern of liver enzyme elevations is usually hepatocellular and severity ranges from mild-to-moderate ALT elevations accompanying the generalized hypersensitivity reaction, to an icteric hepatitis, to a severe hepatitis and acute liver failure. Liver biopsy shows portal inflammation, hepatocellular necrosis and bile duct proliferation. In some instances of severe hypersensitivity syndrome with acute multiorgan failure, the hepatic involvement may represent ischemic injury.Lamotrigine has also been linked to rare instances of hemophagocytic lymphohistiocytosis, a rare and severe immune related reaction characterized by unremitting activation of CD8+ T cells and macrophages that causes multiorgan damage including liver injury, hepatitis and liver failure. Cases of HLH linked to lamotrigine arose within 1 to 4 weeks of starting the drug, usually in infants or children, marked clinically by fever, rash, cytopenias, hepatitis, high triglycerides and ferritin levels and bone marrow or liver histology demonstrating hemophagocytosisLikelihood score: A (well known cause of clinically apparent liver injury).  Xcopri she started it near end of 2023.  Cenobamate:  In controlled clinical trials, addition of cenobamate to standard anticonvulsant therapy was associated with transient, mild-to-moderate serum aminotransferase elevations in 1% to 4% of patients. In the preregistration trials of cenobamate, there were no cases of clinically apparent liver injury with jaundice. However, among 953 patients with exposure to cenobamate in these trials, there were three cases of DRESS syndrome accompanied by serum aminotransferase elevations, which arose during the first 3 to 6 weeks of treatment in subjects who had a relative rapid initial titration (4 to 6 weeks). In large open label studies using a more prolonged titration period (12 weeks), no instances of DRESS syndrome were reported among more than 1000 participants. Felbamate, a structurally related carbamate anticonvulsant, is a well-known cause of drug induced liver injury and has a boxed warning and restricted availability because of severe hypersensitivity reactions including acute liver failure and aplastic anemia. Thus, clinically significant liver injury from cenobamate may occur and can be severe, but is rare.Likelihood score: D (possible rare cause of clinically apparent liver injury in the context of generalized hypersensitivity syndrome).  Mtx: she started that and on it back last 8 months and that was newer and for rheumatism.  Perlivertox  Methotrexate is well known to cause serum aminotransferase elevations and long term therapy has been linked to development of fatty liver disease, fibrosis and even cirrhosis. The literature on methotrexate is extensive, but with great variability in rates of liver test and biopsy abnormalities at different doses, dose regimens and durations of therapy.With high dose intravenous methotrexate, serum ALT levels can rise to 10 to 20 times the upper limit of normal (ULN) within 12 to 48 hours, but levels then fall rapidly to normal with only rare instances of jaundice or symptoms of liver injury. With long term, low-to-moderate dose methotrexate therapy, elevations in serum ALT or AST values occur in 15% to 50% of patients, but are usually mild and self-limiting. Approximately 5% of patients have elevations greater than twice normal and these abnormalities resolve rapidly with discontinuation or dose modification, but can resolve even with continuation at the same dose level. The reported rate of ALT elevations during therapy has varied considerably, perhaps because of differences in frequency of determinations (every month vs every 3, 6 or 12 months) and due to the timing of the blood sampling (whether just before or soon after the once weekly dose). Finally, coadministration of folic acid has been shown to decrease the frequency of serum enzyme elevations and now is commonly used.Long term therapy with methotrexate has been associated with development of fatty liver and hepatic fibrosis and, in rare instances, portal hypertension and symptomatic cirrhosis. Symptoms are usually absent until cirrhosis is present, and liver tests are typically normal or minimally and transiently elevated. Routine monitoring of patients with regular liver biopsies done at 1 to 2 year intervals or with cumulative methotrexate doses of 1 to 10 grams demonstrates that approximately 30% of patients develop mild-to-moderate histological abnormalities (fat, cellular unrest, mild inflammation, nuclear atypical) and 2 to 20% of patients develop some degree of hepatic fibrosis. Well documented cases of cirrhosis arising during long term methotrexate therapy have been reported, but cirrhosis is rare in prospective series, even with routine histological monitoring. Patients who develop fibrosis on long term methotrexate therapy often have other risk factors for fatty liver disease, including excessive alcohol use, obesity, diabetes and concurrent administration of other potentially hepatotoxic agents. Use of high doses and daily methotrexate dosing is particularly associated with development of hepatic fibrosis and rates of cirrhosis of greater than 20% after 5 to 10 years of treatment. With more modern dose regimens (5 to 15 mg in one dose weekly with folate supplementation), fibrosis and clinically apparent liver disease are rare even with long term use. The hepatic fibrosis and cirrhosis due to methotrexate typically arise after 2 to 10 years of treatment and can present with ascites, variceal hemorrhage or hepatosplenomegaly. Some patients present with signs and symptoms of portal hypertension, yet have only moderate degrees of fibrosis, suggesting that methotrexate may also cause nodular regeneration. Patients who develop portal hypertension and cirrhosis usually have had minimal or no elevations in serum aminotransferase or alkaline phosphatase levels, and monitoring using serum enzymes appears to be poorly predictive of fibrosis development. Noninvasive markers of hepatic fibrosis, such as serial platelet counts, serum procollagen III aminoterminal peptide (PIIIP), serum bile acids, hepatic ultrasound, advanced imaging techniques and transient elastography may be more efficient in screening for fibrosis in patients on long term methotrexate, but the reliability and accuracy of these approaches has not been documented prospectively. Patients with cirrhosis due to methotrexate are often asymptomatic and the condition tends to be non-progressive, even in those who restart low dose therapy. Rare instances of hepatocellular carcinoma have been reported in patients with suspected methotrexate induced cirrhosis.Low dose, long term methotrexate therapy has also been implicated in rare instances of reactivation of hepatitis B in patients with rheumatoid arthritis or psoriasis who were HBsAg carriers, without HBeAg and with normal ALT levels and no detectable or low levels of HBV DNA before starting methotrexate. The frequency of reactivation with methotrexate is unknown, but is probably low. Reactivation typically presents after years of therapy with methotrexate and most published cases were also receiving corticosteroids. The clinical presentation is characterized by insidious onset of fatigue, nausea and jaundice accompanied by marked elevations in serum ALT and HBV DNA levels. In some instances, the acute injury is severe and progressive resulting in liver failure. In many case reports, reactivation occurred when methotrexate was withdrawn, perhaps as a result of restoration of immune reactivity in those in whom HBV DNA levels have risen during treatment. Reactivation has also been described in patients with antibodies to HBV without HBsAg (reverse seroconversion) treated with methotrexate and prednisone. The cases of reactivation of hepatitis B published in the literature have mostly resulted in death or emergency liver transplantation, perhaps reflecting publication bias for more severe cases. These cases have led to recommendations for routine screening for HBsAg before starting long term methotrexate therapy and prophylaxis with antiviral agents or careful monitoring for rises in HBV DNA levels if methotrexate is used. However, whether methotrexate on its own, without prednisone, can cause reactivation of hepatitis B is not clear.Likelihood score: A (well known cause of chronic, clinically significant liver injury, portal hypertension and cirrhosis).  She should let the rheumatologist know re the labs being up as the mtx and lamictal are the two most likely to do this.  [facility] 2020 labs showed WBC 12.6, hemoglobin 13.1 platelet count 281 MCV 96.2 neutrophils 8.6 elevated lymphocytes 3.0 glucose 93 BUN 25 creatinine 1.16 sodium 137 potassium 4.4 calcium 8.4 AST 18 ALT 18 alk phos 91 bilirubin 0.3  [facility] ultrasound 2020 showed liver echogenicity increased suggesting fatty infiltration in the well-circumscribed hypoechoic lesion in the right lobe measuring 1.9 x 2.6 x 2 cm and approximately stable versus 2019 and allowing for slight differences in technique. No gallstones seen. Common bile duct 2 mm. Partially visualized pancreas unremarkable. Right kidney 10.2 cm with no hydronephrosis. They recommended further characterization with MRI if not previously performed.  CT stone protocol done in 2020 showing stable 2.8 cm hypodense lesion posterior right lobe of the liver likely benign. Gallbladder biliary tree normal and spleen normal. Appendix not well-seen however no right lower quadrant inflammatory changes seen. Pancreas normal as were adrenal glands and kidneys. No aggressive osseous lesion but likely stable hemangioma at the lumbar 1 vertebral body also seen. No renal stones.

## 2024-12-18 ENCOUNTER — TELEPHONE ENCOUNTER (OUTPATIENT)
Dept: URBAN - METROPOLITAN AREA CLINIC 86 | Facility: CLINIC | Age: 59
End: 2024-12-18

## 2024-12-18 LAB
A/G RATIO: 1.8
ABSOLUTE BASOPHILS: 9
ABSOLUTE EOSINOPHILS: 28
ABSOLUTE LYMPHOCYTES: 1076
ABSOLUTE MONOCYTES: 320
ABSOLUTE NEUTROPHILS: 3267
ALBUMIN: 4.3
ALKALINE PHOSPHATASE: 106
ALT (SGPT): 13
AST (SGOT): 14
BASOPHILS: 0.2
BILIRUBIN, TOTAL: 0.3
BUN/CREATININE RATIO: (no result)
BUN: 20
CALCIUM: 9.1
CARBON DIOXIDE, TOTAL: 26
CHLORIDE: 107
CREATININE: 0.82
EGFR: 82
EOSINOPHILS: 0.6
GLOBULIN, TOTAL: 2.4
GLUCOSE: 91
HEMATOCRIT: 38.1
HEMOGLOBIN: 13
LYMPHOCYTES: 22.9
MCH: 34.1
MCHC: 34.1
MCV: 100
MONOCYTES: 6.8
MPV: 10.8
NEUTROPHILS: 69.5
PLATELET COUNT: 257
POTASSIUM: 4.4
PROTEIN, TOTAL: 6.7
RDW: 11.5
RED BLOOD CELL COUNT: 3.81
SODIUM: 141
WHITE BLOOD CELL COUNT: 4.7

## 2024-12-18 NOTE — HPI-TODAY'S VISIT:
Dear Ms. Farah, The recent labs were sent to me.  The complete blood count overall normal.  The AST 19 and ALT was 32 and this was back in August and your most recent labs that you did on December 17 showed that the AST was 14 and the ALT was 13 so even lower now and happy to see this.  The hemoglobin was also normal and the platelets as well. Genna Kwong PA-C

## 2024-12-30 ENCOUNTER — TELEPHONE ENCOUNTER (OUTPATIENT)
Dept: URBAN - METROPOLITAN AREA CLINIC 86 | Facility: CLINIC | Age: 59
End: 2024-12-30

## 2025-02-04 ENCOUNTER — TELEPHONE ENCOUNTER (OUTPATIENT)
Dept: URBAN - METROPOLITAN AREA CLINIC 86 | Facility: CLINIC | Age: 60
End: 2025-02-04

## 2025-02-04 NOTE — HPI-TODAY'S VISIT:
Lenora Farah, The MRI was sent to us. The liver appearred normal and there was an unchanged hemangiomas seen in segment 6 that was about 2.8 x 2.4 cm.  This is benign and you are already aware of this.  They noted mild parenchymal atrophy of the pancreas.  No lesions.  Please share with your primary care.  The spleen, adrenals, kidneys all normal.  Gallbladder and biliary tree were normal.  Overall they saw unchanged hemangiomas.  Genna Kwong PA-C

## 2025-03-05 ENCOUNTER — TELEPHONE ENCOUNTER (OUTPATIENT)
Dept: URBAN - METROPOLITAN AREA CLINIC 105 | Facility: CLINIC | Age: 60
End: 2025-03-05

## 2025-03-05 RX ORDER — LANSOPRAZOLE 30 MG/1
1 CAPSULE BEFORE A MEAL CAPSULE, DELAYED RELEASE ORAL ONCE A DAY
Qty: 90 CAPSULE | Refills: 3

## 2025-05-05 ENCOUNTER — LAB OUTSIDE AN ENCOUNTER (OUTPATIENT)
Dept: URBAN - METROPOLITAN AREA CLINIC 86 | Facility: CLINIC | Age: 60
End: 2025-05-05

## 2025-05-19 ENCOUNTER — OFFICE VISIT (OUTPATIENT)
Dept: URBAN - METROPOLITAN AREA CLINIC 86 | Facility: CLINIC | Age: 60
End: 2025-05-19
Payer: COMMERCIAL

## 2025-05-19 DIAGNOSIS — K76.0 HEPATIC STEATOSIS: ICD-10-CM

## 2025-05-19 DIAGNOSIS — R10.13 DYSPEPSIA: ICD-10-CM

## 2025-05-19 DIAGNOSIS — K76.9 LIVER LESION: ICD-10-CM

## 2025-05-19 DIAGNOSIS — D18.03 LIVER HEMANGIOMA: ICD-10-CM

## 2025-05-19 DIAGNOSIS — G40.909 SEIZURE DISORDER: ICD-10-CM

## 2025-05-19 DIAGNOSIS — K58.9 IRRITABLE BOWEL SYNDROME (IBS): ICD-10-CM

## 2025-05-19 DIAGNOSIS — R93.5 ABNORMAL ULTRASOUND OF ABDOMEN: ICD-10-CM

## 2025-05-19 DIAGNOSIS — Z79.899 HIGH RISK MEDICATION USE: ICD-10-CM

## 2025-05-19 DIAGNOSIS — Z71.85 VACCINE COUNSELING: ICD-10-CM

## 2025-05-19 PROCEDURE — 99214 OFFICE O/P EST MOD 30 MIN: CPT | Performed by: PHYSICIAN ASSISTANT

## 2025-05-19 RX ORDER — CENOBAMATE 50 MG/1
1 TABLET TABLET, FILM COATED ORAL ONCE A DAY
Status: ACTIVE | COMMUNITY

## 2025-05-19 RX ORDER — OMEPRAZOLE 40 MG/1
1 CAPSULE 30 MINUTES BEFORE MORNING MEAL CAPSULE, DELAYED RELEASE ORAL ONCE A DAY
Qty: 90 | Refills: 0 | Status: ON HOLD | COMMUNITY
Start: 2024-02-22

## 2025-05-19 RX ORDER — ATORVASTATIN CALCIUM 10 MG/1
1 TABLET TABLET, FILM COATED ORAL ONCE A DAY
Status: ACTIVE | COMMUNITY

## 2025-05-19 RX ORDER — FOLIC ACID 1 MG/1
1 TABLET TABLET ORAL ONCE A DAY
Status: ACTIVE | COMMUNITY

## 2025-05-19 RX ORDER — FLUTICASONE PROPIONATE 50 UG/1
1 SPRAY IN EACH NOSTRIL SPRAY, METERED NASAL ONCE A DAY
Status: ACTIVE | COMMUNITY

## 2025-05-19 RX ORDER — PLECANATIDE 3 MG/1
1 TABLET TABLET ORAL ONCE A DAY
Qty: 90 | Refills: 3 | Status: ACTIVE | COMMUNITY

## 2025-05-19 RX ORDER — PROMETHAZINE HYDROCHLORIDE 25 MG/1
TABLET ORAL
Qty: 0 | Refills: 0 | Status: ACTIVE | COMMUNITY
Start: 1900-01-01

## 2025-05-19 RX ORDER — GABAPENTIN 300 MG/1
1 CAPSULE CAPSULE ORAL ONCE A DAY
Status: ACTIVE | COMMUNITY

## 2025-05-19 RX ORDER — TOPIRAMATE 100 MG/1
1 IN AND 2 IN PM TABLET, FILM COATED ORAL TWICE A DAY
Status: ON HOLD | COMMUNITY

## 2025-05-19 RX ORDER — LANSOPRAZOLE 30 MG/1
1 CAPSULE BEFORE A MEAL CAPSULE, DELAYED RELEASE ORAL ONCE A DAY
Qty: 90 CAPSULE | Refills: 3 | Status: ACTIVE | COMMUNITY

## 2025-05-19 RX ORDER — SUMATRIPTAN SUCCINATE 100 MG/1
1 TABLET AT LEAST 2 HOURS BETWEEN DOSES AS NEEDED TABLET, FILM COATED ORAL TWICE A DAY
Status: ACTIVE | COMMUNITY

## 2025-05-19 RX ORDER — LAMOTRIGINE 100 MG/1
1 TABLET TABLET, ORALLY DISINTEGRATING ORAL TWICE A DAY
Status: ON HOLD | COMMUNITY

## 2025-05-19 RX ORDER — DICYCLOMINE HYDROCHLORIDE 10 MG/1
2 CAPSULES CAPSULE ORAL THREE TIMES A DAY
Status: ACTIVE | COMMUNITY

## 2025-05-19 RX ORDER — HYDROXYCHLOROQUINE SULFATE 200 MG/1
AS DIRECTED TABLET, FILM COATED ORAL
Status: ACTIVE | COMMUNITY

## 2025-05-19 NOTE — HPI-TODAY'S VISIT:
Pt is listed as being a 59-year-old female who was previously seen 2024 by us and also sees Dr. Fernandez in our group and sees DR Fleming for primary care and  came in to be seen for liver lesions.  A copy of the note will be sent to the referring provider.  25 Lenora Farah,  The MRI was sent to us. The liver appearred normal and there was an unchanged hemangiomas seen in segment 6 that was about 2.8 x 2.4 cm. This is benign and you are already aware of this. They noted mild parenchymal atrophy of the pancreas. No lesions. Please share with your primary care. The spleen, adrenals, kidneys all normal. Gallbladder and biliary tree were normal. Overall they saw unchanged hemangiomas.  Genna Kwong PA-C Dear Ms. Farah,  The recent labs were sent to me. The complete blood count overall normal. The AST 19 and ALT was 13 and this was back in August and your most recent labs that you did on  showed that the AST was 14 and the ALT was 13 so even lower now and happy to see this. The hemoglobin was also normal and the platelets as well.  Genna Kwong PA-C  2025 labs w tb 0.3, alp 103, ast 20 and alt 15 so normal still  she is not on anymew meds   recap 24 The 24/ labs were sent to me. The bilirubin 0.3, AST 19, ALT 32. Goal for the AST and ALT is less than 25. Previosuly the ast 27 and alt 56 an this is lower and as you continue to loose weight this will improve. We will check the ultrasound in november and see you in december.    Genna Kwong PA-C  24 ov  May 19 MRI was done with no contrast so am glad done that way given our discussion of the other day.  Lower chest views shows lower thorax normal.  Liver with normal morphology and no significant fat or iron deposition within the liver fat fraction 2.3%.  Stable hemangioma was seen in segment 6 of the liver measuring 3.0 x 2.4 cm versus January. Some punctate hepatic cysts were seen.  Gallbladder/biliary, spleen, adrenal glands, kidneys, gastrointestinal views, lymph nodes, and vessels as well as peritoneum were all normal which is good to know. In the pancreas he did have mild pancreatic parenchymal atrophy that was seen.  Bone and soft tissue views showed unchanged intraosseous hemangiomas within the thoracolumbar spine.  Please share with primary provider to be aware of.  In summary, stable hepatic segment 6 hemangioma seen with normal liver morphology and without significant liver fat.  I do not at this poing see a reason to redo MRI  with contrast at this point so this report was sufficient for what we wanted to check on.  We will attempt next scan to be u.s to follow the area over time.  Dr. Wilburn   need to get labs today.  pending eeg to check on her seizures.  Asked about the rheumatologist and she is now taking plaquenil but notes not taking regularly due to the increase in her epilepsy medication  recap She has  not had another scan noted since dec.  She did egd and was neg for celiac and trying to see.   labs showed WBC 6.4 hemoglobin 12.1 platelet count 265 MCV 98.7 sodium 144 potassium 4.0 chloride 114 CO2 22 BUN of 22 creatinine 1.05 glucose 58 bilirubin 0.3 alk phos 102 AST 27 ALT 56.   celiac panel noted to be negative.   labs show glucose 72 BUN 23 creatinine 1.08 elevated sodium 143 potassium 4.1 chloride 112 CO2 24 albumin 4.1 bilirubin 0.3 alk phos 92 AST of 16 ALT of 23 WBC 6.5 hemoglobin 12.6 plate count 249.  2/ ov Genna   labs show IgG normal at 997 and IgM normal at 78 which would suggest that the immune system is not at an abnormal level at this timeframe.  You have no immunity seen to hepatitis B surface antibody and need to consider elective vaccination for same.   Smooth muscle antibody was negative as well as AMA.  Sugar was normal at 85 BUN was normal at 19 with creatinine up to 1.12. Please share with primary provider.  Sodium 142 potassium 4.3 normal. Chloride a little up at 111. CO2 of 24 with a calcium 9.2 and albumin 4.3 bilirubin 0.3 alk phos 92 and an AST of 18.  ALT was 31 which was a little up. Ideal ALT is less than 25.  Your SOFIA screen was positive but again as you saw immunoglobulins were not increased suggesting that while the SOFIA is positive it does not appear to be driving issues.  WBC was 6.7 hemoglobin 12.7 plate count 265 MCV 97 with normal neutrophils and lymphocytes.  Hep B core total negative indicating no prior exposure to hep B and hep B surface antigen negative indicating no active hep B.  You are immune to hepatitis A however so that is good to know. Also your hep C antibody was negative which is also good to note.  She says that was on an arthritis med and not on it now.  Sees Rheum soon.  For comparison, 2023 labs showed AST of 89 and ALT of 47 so curiously labs are lower.  Certainly curious would be if the methotrexate dose timing and the drop of the labs correlated that would be important to note because shortly after the methotrexate dosing you would expect the labs to be higher and just prior to the next methotrexate dosing you would expect the labs to be lower.  Please share these findings with your primary providers.  She will look into other than MTX options for this.  aniket  Has epiliepsy and she sees [facility] and she Nov/Dec  2023. That raised the concern re the liver.  Notes reviewed brenda and prince:   Patient listed as having a past medical history of appendectomy, hysterectomy, duodenitis, hepatic hemangioma, and IBS.  In the past has been treated with dicyclomine and Phenergan by her primary GI.  Patient also mentions having seizure disorder and cavernous malformation history. Followed by [facility] for that and last seizure recent and Tuesday of this week. She has them often and mainly at night.  Lumbar radiculopathy and cervical disc disease as well as hiatal hernia.  Other history included carpal tunnel, endometriosis, family history of breast cancer, fibrocystic breast disease, GERD, migraine, plantar fasciitis.  Other surgeries include brain surgery, , left knee surgery, colonoscopy and upper endoscopy. Endometrial ablation, hysterectomy, shoulder surgery, total abdominal hysterectomy and bilateral salpingo-oophorectomy. Foot surgery.  Family history of sister with breast cancer and leukemia and she passed. Mother with history of heart disease cervical cancer hypertension arthritis.  Social history not a smoker not a drinker.  Medicines listed included albuterol 90 mcg inhaler as needed, dicyclomine 10 mg as needed, Flonase 50 mcg as needed. Lamictal 100 mg twice a day, Trileptal 300 mg twice a day, pantoprazole 40 mL a day, Trulance 3 mg a day, Phenergan 25 mg as needed.  Allergies to aspirin, Cipro, Keppra which cause itching, and IV iodine contrast which caused a rash. She previously had a CT contrast rash reaction responded to Benadryl.  Weight when last seen at [facility] had been 101.2 kg with a BMI of 38.28. She says 213.  Labs then showed RBC count 3.73, neutrophils 4.9, creatinine 1.05 chloride 111 bilirubin 0.2.  Mri done 1.5 weeks ago.  Dec 2023: ct:  FINDINGS: Normal heart size with trace pericardial effusion. Lung bases are clear.  Indeterminant somewhat ill-defined hypodense subcapsular lesion in the right hepatic lobe measuring 2.8 x 2.5 cm (series 2 image 23). Gallbladder and biliary tree are normal. Spleen is unremarkable. Mild fatty atrophy of the pancreas without ductal dilation or inflammation. No adrenal nodule. Kidneys are symmetric in size without contour deforming lesion, hydronephrosis or urolithiasis.  Air-fluid level in the distal esophagus may suggest reflux. Stomach is mostly decompressed. No bowel obstruction. The appendix is not clearly identified (she does not have), system put reported clinical history of prior appendectomy. Mild colonic fecal loading. No free fluid or free air.  Normal caliber abdominal aorta. No abdominal adenopathy.  Urinary bladder is normal. Uterus is absent. No suspicious adnexal lesion, free pelvic fluid or enlarged pelvic adenopathy.  Small trabeculated lucency in the posterior T9 vertebral body favors an osseous hemangioma. No acute fracture or aggressive osseous lesion.  IMPRESSION:1. Indeterminant somewhat ill-defined hypodense subcapsular lesion in the right hepatic lobe measuring 2.8 x 2.5 cm. Benign and malignant etiologies are considered possible for this lesion, which should be completely characterized with a short interval nonemergent abdominal MRI with contrast. 2. No additional acute findings in the abdomen or pelvis to suggest etiology for flank pain. Specifically, no urolithiasis. Appendix is not seen, consistent with reported history of appendectomy.3. Mild colonic fecal loading. Air-fluid level in the distal esophagus may suggest reflux  Mri 2024:  FINDINGS: Lower Thorax: Normal.  Liver: No fat or iron. Segment 6 2.9 x 2.4 cm (5:21) T2 hyperintense T1 isointense lesion with progressive discontinuous nodular peripheral enhancement consistent with hemangioma.    Gallbladder/Biliary Tree: Normal.  Spleen: Normal.  Pancreas: Normal.  Adrenal Glands: Normal.   Kidneys/Ureters: Normal.  Gastrointestinal: Normal.   Lymph Nodes: Normal  Vessels: Normal.  Peritoneum/Retroperitoneum: Normal.  Bones/Soft Tissues: Degenerative changes of spine without aggressive osseous lesion.  IMPRESSION:Segment 6 hepatic hemangioma. No concerning hepatic lesions.  The images were reviewed and interpreted by Carmen Booth MD.  CT scan in 2022 ER visit showed no acute malady in the abdomen or pelvis but there was a 2.1 cm hypodensity in the posterior right lobe which was felt unchanged versus 2020 while incomplete characterize could be hemangioma.  CT head at the time showed a subcentimeter hypodensity in the left medial temporal lobe that was seen in 2020 that appeared to be slightly decreased in size and may have been suspicious for an underlying cavernous transformation area. No gray-white loss seen and no hydrocephalus.  Patient had EGD 2020 that showed no gross lesions and few diminutive sessile polyps with no stigmata of recent bleeding in the stomach with a polyp removed with cold forceps. Grade B esophagitis was seen and 2 cm hiatal hernia. Path report showed fundic gland polyp and no H. pylori.  She did egd in 2024.  Dec 27 2023L na 141 and k 3.8 amd cl 111 and co2 23 and bun 17 and cr 1,06 and glu 83 and alb 4.3 and alk 91 and ast 89 and alt 47 and 2023 alk 96 and ast 25 and alt 33.   They in the lamictal in between  and dec, She remains on the higher dose.  Lamictal livertox:  Prospective studies suggest that less than 1% of subjects develop elevations in serum aminotransferase levels during long term lamotrigine therapy. However, clinically apparent hepatotoxicity from lamotrigine is well known and is estimated to occur in one in 2,000 to 10,000 treated patients. The liver injury is usually part of a systemic immuno-allergic reaction (anticonvulsant hypersensitivity syndrome [AHS] or drug rash with eosinophilia and systemic symptoms [DRESS] syndrome). The latency is typically short, ranging from one to several weeks. Presenting symptoms are a diffuse maculopapular rash, followed in a few days by high fever, nausea and vomiting. The rash can develop into a systemic hypersensitivity reaction and multiorgan failure or be associated with jaundice and hepatitis. Eosinophilia is common, and facial edema, lymphadenopathy and atypical lymphocytosis can occur. The pattern of liver enzyme elevations is usually hepatocellular and severity ranges from mild-to-moderate ALT elevations accompanying the generalized hypersensitivity reaction, to an icteric hepatitis, to a severe hepatitis and acute liver failure. Liver biopsy shows portal inflammation, hepatocellular necrosis and bile duct proliferation. In some instances of severe hypersensitivity syndrome with acute multiorgan failure, the hepatic involvement may represent ischemic injury.Lamotrigine has also been linked to rare instances of hemophagocytic lymphohistiocytosis, a rare and severe immune related reaction characterized by unremitting activation of CD8+ T cells and macrophages that causes multiorgan damage including liver injury, hepatitis and liver failure. Cases of HLH linked to lamotrigine arose within 1 to 4 weeks of starting the drug, usually in infants or children, marked clinically by fever, rash, cytopenias, hepatitis, high triglycerides and ferritin levels and bone marrow or liver histology demonstrating hemophagocytosisLikelihood score: A (well known cause of clinically apparent liver injury).  Xcopri she started it near end of 2023.  Cenobamate:  In controlled clinical trials, addition of cenobamate to standard anticonvulsant therapy was associated with transient, mild-to-moderate serum aminotransferase elevations in 1% to 4% of patients. In the preregistration trials of cenobamate, there were no cases of clinically apparent liver injury with jaundice. However, among 953 patients with exposure to cenobamate in these trials, there were three cases of DRESS syndrome accompanied by serum aminotransferase elevations, which arose during the first 3 to 6 weeks of treatment in subjects who had a relative rapid initial titration (4 to 6 weeks). In large open label studies using a more prolonged titration period (12 weeks), no instances of DRESS syndrome were reported among more than 1000 participants. Felbamate, a structurally related carbamate anticonvulsant, is a well-known cause of drug induced liver injury and has a boxed warning and restricted availability because of severe hypersensitivity reactions including acute liver failure and aplastic anemia. Thus, clinically significant liver injury from cenobamate may occur and can be severe, but is rare.Likelihood score: D (possible rare cause of clinically apparent liver injury in the context of generalized hypersensitivity syndrome).  Mtx: she started that and on it back last 8 months and that was newer and for rheumatism.  Perlivertox  Methotrexate is well known to cause serum aminotransferase elevations and long term therapy has been linked to development of fatty liver disease, fibrosis and even cirrhosis. The literature on methotrexate is extensive, but with great variability in rates of liver test and biopsy abnormalities at different doses, dose regimens and durations of therapy.With high dose intravenous methotrexate, serum ALT levels can rise to 10 to 20 times the upper limit of normal (ULN) within 12 to 48 hours, but levels then fall rapidly to normal with only rare instances of jaundice or symptoms of liver injury. With long term, low-to-moderate dose methotrexate therapy, elevations in serum ALT or AST values occur in 15% to 50% of patients, but are usually mild and self-limiting. Approximately 5% of patients have elevations greater than twice normal and these abnormalities resolve rapidly with discontinuation or dose modification, but can resolve even with continuation at the same dose level. The reported rate of ALT elevations during therapy has varied considerably, perhaps because of differences in frequency of determinations (every month vs every 3, 6 or 12 months) and due to the timing of the blood sampling (whether just before or soon after the once weekly dose). Finally, coadministration of folic acid has been shown to decrease the frequency of serum enzyme elevations and now is commonly used.Long term therapy with methotrexate has been associated with development of fatty liver and hepatic fibrosis and, in rare instances, portal hypertension and symptomatic cirrhosis. Symptoms are usually absent until cirrhosis is present, and liver tests are typically normal or minimally and transiently elevated. Routine monitoring of patients with regular liver biopsies done at 1 to 2 year intervals or with cumulative methotrexate doses of 1 to 10 grams demonstrates that approximately 30% of patients develop mild-to-moderate histological abnormalities (fat, cellular unrest, mild inflammation, nuclear atypical) and 2 to 20% of patients develop some degree of hepatic fibrosis. Well documented cases of cirrhosis arising during long term methotrexate therapy have been reported, but cirrhosis is rare in prospective series, even with routine histological monitoring. Patients who develop fibrosis on long term methotrexate therapy often have other risk factors for fatty liver disease, including excessive alcohol use, obesity, diabetes and concurrent administration of other potentially hepatotoxic agents. Use of high doses and daily methotrexate dosing is particularly associated with development of hepatic fibrosis and rates of cirrhosis of greater than 20% after 5 to 10 years of treatment. With more modern dose regimens (5 to 15 mg in one dose weekly with folate supplementation), fibrosis and clinically apparent liver disease are rare even with long term use. The hepatic fibrosis and cirrhosis due to methotrexate typically arise after 2 to 10 years of treatment and can present with ascites, variceal hemorrhage or hepatosplenomegaly. Some patients present with signs and symptoms of portal hypertension, yet have only moderate degrees of fibrosis, suggesting that methotrexate may also cause nodular regeneration. Patients who develop portal hypertension and cirrhosis usually have had minimal or no elevations in serum aminotransferase or alkaline phosphatase levels, and monitoring using serum enzymes appears to be poorly predictive of fibrosis development. Noninvasive markers of hepatic fibrosis, such as serial platelet counts, serum procollagen III aminoterminal peptide (PIIIP), serum bile acids, hepatic ultrasound, advanced imaging techniques and transient elastography may be more efficient in screening for fibrosis in patients on long term methotrexate, but the reliability and accuracy of these approaches has not been documented prospectively. Patients with cirrhosis due to methotrexate are often asymptomatic and the condition tends to be non-progressive, even in those who restart low dose therapy. Rare instances of hepatocellular carcinoma have been reported in patients with suspected methotrexate induced cirrhosis.Low dose, long term methotrexate therapy has also been implicated in rare instances of reactivation of hepatitis B in patients with rheumatoid arthritis or psoriasis who were HBsAg carriers, without HBeAg and with normal ALT levels and no detectable or low levels of HBV DNA before starting methotrexate. The frequency of reactivation with methotrexate is unknown, but is probably low. Reactivation typically presents after years of therapy with methotrexate and most published cases were also receiving corticosteroids. The clinical presentation is characterized by insidious onset of fatigue, nausea and jaundice accompanied by marked elevations in serum ALT and HBV DNA levels. In some instances, the acute injury is severe and progressive resulting in liver failure. In many case reports, reactivation occurred when methotrexate was withdrawn, perhaps as a result of restoration of immune reactivity in those in whom HBV DNA levels have risen during treatment. Reactivation has also been described in patients with antibodies to HBV without HBsAg (reverse seroconversion) treated with methotrexate and prednisone. The cases of reactivation of hepatitis B published in the literature have mostly resulted in death or emergency liver transplantation, perhaps reflecting publication bias for more severe cases. These cases have led to recommendations for routine screening for HBsAg before starting long term methotrexate therapy and prophylaxis with antiviral agents or careful monitoring for rises in HBV DNA levels if methotrexate is used. However, whether methotrexate on its own, without prednisone, can cause reactivation of hepatitis B is not clear.Likelihood score: A (well known cause of chronic, clinically significant liver injury, portal hypertension and cirrhosis).  She should let the rheumatologist know re the labs being up as the mtx and lamictal are the two most likely to do this.  [facility] 2020 labs showed WBC 12.6, hemoglobin 13.1 platelet count 281 MCV 96.2 neutrophils 8.6 elevated lymphocytes 3.0 glucose 93 BUN 25 creatinine 1.16 sodium 137 potassium 4.4 calcium 8.4 AST 18 ALT 18 alk phos 91 bilirubin 0.3  [facility] ultrasound 2020 showed liver echogenicity increased suggesting fatty infiltration in the well-circumscribed hypoechoic lesion in the right lobe measuring 1.9 x 2.6 x 2 cm and approximately stable versus 2019 and allowing for slight differences in technique. No gallstones seen. Common bile duct 2 mm. Partially visualized pancreas unremarkable. Right kidney 10.2 cm with no hydronephrosis. They recommended further characterization with MRI if not previously performed.  CT stone protocol done in 2020 showing stable 2.8 cm hypodense lesion posterior right lobe of the liver likely benign. Gallbladder biliary tree normal and spleen normal. Appendix not well-seen however no right lower quadrant inflammatory changes seen. Pancreas normal as were adrenal glands and kidneys. No aggressive osseous lesion but likely stable hemangioma at the lumbar 1 vertebral body also seen. No renal stones.

## 2025-06-12 ENCOUNTER — TELEPHONE ENCOUNTER (OUTPATIENT)
Dept: URBAN - METROPOLITAN AREA CLINIC 86 | Facility: CLINIC | Age: 60
End: 2025-06-12

## 2025-06-12 NOTE — HPI-TODAY'S VISIT:
Dear Hoa Farah,  The recent ultrasound was sent to us.  They did compare this to the MRI prior.  The liver appeared normal in echogenicity.  They noted that the hemangioma seen prior was about 3 x 2.8 x 3 cm and they felt that this was stable given differences in measuring technique and differences in the modality of the imaging.  Happy to see that stable.  Bile ducts normal.  Gallbladder appeared normal.  Right left kidney normal.  The hepatic vascular appeared patent.  Spleen about 8.1 cm and this is normal.  Overall they thought the hemangioma was stable.  Genna Kwong PA-C